# Patient Record
Sex: MALE | Race: WHITE | NOT HISPANIC OR LATINO | Employment: OTHER | URBAN - METROPOLITAN AREA
[De-identification: names, ages, dates, MRNs, and addresses within clinical notes are randomized per-mention and may not be internally consistent; named-entity substitution may affect disease eponyms.]

---

## 2024-10-15 ENCOUNTER — HOSPITAL ENCOUNTER (INPATIENT)
Facility: HOSPITAL | Age: 84
LOS: 5 days | Discharge: HOSPICE | DRG: 190 | End: 2024-10-20
Attending: INTERNAL MEDICINE | Admitting: INTERNAL MEDICINE
Payer: MEDICARE

## 2024-10-15 ENCOUNTER — HOSPITAL ENCOUNTER (INPATIENT)
Dept: OTHER | Facility: HOSPITAL | Age: 84
Discharge: HOME | DRG: 190 | End: 2024-10-15
Payer: MEDICARE

## 2024-10-15 DIAGNOSIS — Z51.5 END OF LIFE CARE: ICD-10-CM

## 2024-10-15 DIAGNOSIS — R26.2 DIFFICULTY IN WALKING: Primary | ICD-10-CM

## 2024-10-15 PROBLEM — J44.1 COPD EXACERBATION: Status: ACTIVE | Noted: 2024-10-15

## 2024-10-15 PROBLEM — J44.1 ACUTE EXACERBATION OF CHRONIC OBSTRUCTIVE PULMONARY DISEASE (COPD): Status: ACTIVE | Noted: 2024-10-15

## 2024-10-15 PROCEDURE — 99223 1ST HOSP IP/OBS HIGH 75: CPT | Performed by: STUDENT IN AN ORGANIZED HEALTH CARE EDUCATION/TRAINING PROGRAM

## 2024-10-15 PROCEDURE — 94799 UNLISTED PULMONARY SVC/PX: CPT

## 2024-10-15 PROCEDURE — 94761 N-INVAS EAR/PLS OXIMETRY MLT: CPT

## 2024-10-15 PROCEDURE — 94640 AIRWAY INHALATION TREATMENT: CPT

## 2024-10-15 RX ORDER — BISACODYL 10 MG
10 SUPPOSITORY, RECTAL RECTAL DAILY PRN
Status: DISCONTINUED | OUTPATIENT
Start: 2024-10-15 | End: 2024-10-20 | Stop reason: HOSPADM

## 2024-10-15 RX ORDER — BUDESONIDE 0.5 MG/2ML
0.5 INHALANT ORAL
Status: DISCONTINUED | OUTPATIENT
Start: 2024-10-15 | End: 2024-10-20 | Stop reason: HOSPADM

## 2024-10-15 RX ORDER — ALBUTEROL SULFATE 0.83 MG/ML
2.5 SOLUTION RESPIRATORY (INHALATION) EVERY 6 HOURS PRN
Status: DISCONTINUED | OUTPATIENT
Start: 2024-10-15 | End: 2024-10-16

## 2024-10-15 RX ORDER — AMOXICILLIN 250 MG
2 CAPSULE ORAL 2 TIMES DAILY PRN
Status: DISCONTINUED | OUTPATIENT
Start: 2024-10-15 | End: 2024-10-20 | Stop reason: HOSPADM

## 2024-10-15 RX ORDER — METHYLPREDNISOLONE SODIUM SUCCINATE 40 MG/ML
40 INJECTION, POWDER, LYOPHILIZED, FOR SOLUTION INTRAMUSCULAR; INTRAVENOUS EVERY 12 HOURS
Status: DISCONTINUED | OUTPATIENT
Start: 2024-10-15 | End: 2024-10-16

## 2024-10-15 RX ORDER — IBUPROFEN 600 MG/1
1 TABLET ORAL
Status: DISCONTINUED | OUTPATIENT
Start: 2024-10-15 | End: 2024-10-20 | Stop reason: HOSPADM

## 2024-10-15 RX ORDER — SODIUM CHLORIDE 9 MG/ML
100 INJECTION, SOLUTION INTRAVENOUS CONTINUOUS
Status: ACTIVE | OUTPATIENT
Start: 2024-10-16 | End: 2024-10-16

## 2024-10-15 RX ORDER — NICOTINE POLACRILEX 4 MG
15 LOZENGE BUCCAL
Status: DISCONTINUED | OUTPATIENT
Start: 2024-10-15 | End: 2024-10-20 | Stop reason: HOSPADM

## 2024-10-15 RX ORDER — POLYETHYLENE GLYCOL 3350 17 G/17G
17 POWDER, FOR SOLUTION ORAL DAILY PRN
Status: DISCONTINUED | OUTPATIENT
Start: 2024-10-15 | End: 2024-10-20 | Stop reason: HOSPADM

## 2024-10-15 RX ORDER — DEXTROSE MONOHYDRATE 25 G/50ML
25 INJECTION, SOLUTION INTRAVENOUS
Status: DISCONTINUED | OUTPATIENT
Start: 2024-10-15 | End: 2024-10-20 | Stop reason: HOSPADM

## 2024-10-15 RX ORDER — BISACODYL 5 MG/1
5 TABLET, DELAYED RELEASE ORAL DAILY PRN
Status: DISCONTINUED | OUTPATIENT
Start: 2024-10-15 | End: 2024-10-20 | Stop reason: HOSPADM

## 2024-10-15 RX ORDER — SODIUM CHLORIDE 0.9 % (FLUSH) 0.9 %
10 SYRINGE (ML) INJECTION EVERY 12 HOURS SCHEDULED
Status: DISCONTINUED | OUTPATIENT
Start: 2024-10-15 | End: 2024-10-20 | Stop reason: HOSPADM

## 2024-10-15 RX ORDER — SODIUM CHLORIDE 0.9 % (FLUSH) 0.9 %
10 SYRINGE (ML) INJECTION AS NEEDED
Status: DISCONTINUED | OUTPATIENT
Start: 2024-10-15 | End: 2024-10-20 | Stop reason: HOSPADM

## 2024-10-15 RX ORDER — ENOXAPARIN SODIUM 100 MG/ML
1 INJECTION SUBCUTANEOUS ONCE
Status: COMPLETED | OUTPATIENT
Start: 2024-10-16 | End: 2024-10-16

## 2024-10-15 RX ORDER — AZITHROMYCIN 250 MG/1
500 TABLET, FILM COATED ORAL
Status: DISPENSED | OUTPATIENT
Start: 2024-10-16 | End: 2024-10-18

## 2024-10-15 RX ORDER — ARFORMOTEROL TARTRATE 15 UG/2ML
15 SOLUTION RESPIRATORY (INHALATION)
Status: DISCONTINUED | OUTPATIENT
Start: 2024-10-15 | End: 2024-10-20 | Stop reason: HOSPADM

## 2024-10-15 RX ADMIN — BUDESONIDE 0.5 MG: 0.5 SUSPENSION RESPIRATORY (INHALATION) at 23:24

## 2024-10-15 RX ADMIN — ARFORMOTEROL TARTRATE 15 MCG: 15 SOLUTION RESPIRATORY (INHALATION) at 23:24

## 2024-10-15 NOTE — PLAN OF CARE
Eric Huerta is an 84-year-old gent with past medical history of COPD on home oxygen, remote history of lung cancer who presented to [facility] and found to be short of breath.  D-dimer was elevated so they got a CT scan of his chest which showed an area that was concerning for malignancy.  Is also noted to be in a COPD exacerbation.  Troponin was mildly elevated thought to be due to demand.  Due to this new mass found on his CT of chest patient will be transferred here for further management of COPD exacerbation as well as evaluation by pulmonology for possible biopsy for lung cancer

## 2024-10-16 ENCOUNTER — APPOINTMENT (OUTPATIENT)
Facility: HOSPITAL | Age: 84
DRG: 190 | End: 2024-10-16
Payer: MEDICARE

## 2024-10-16 LAB

## 2024-10-16 PROCEDURE — 25010000002 ENOXAPARIN PER 10 MG: Performed by: INTERNAL MEDICINE

## 2024-10-16 PROCEDURE — 25010000002 ENOXAPARIN PER 10 MG: Performed by: STUDENT IN AN ORGANIZED HEALTH CARE EDUCATION/TRAINING PROGRAM

## 2024-10-16 PROCEDURE — 99232 SBSQ HOSP IP/OBS MODERATE 35: CPT | Performed by: INTERNAL MEDICINE

## 2024-10-16 PROCEDURE — 25810000003 SODIUM CHLORIDE 0.9 % SOLUTION: Performed by: STUDENT IN AN ORGANIZED HEALTH CARE EDUCATION/TRAINING PROGRAM

## 2024-10-16 PROCEDURE — 82948 REAGENT STRIP/BLOOD GLUCOSE: CPT

## 2024-10-16 PROCEDURE — 94664 DEMO&/EVAL PT USE INHALER: CPT

## 2024-10-16 PROCEDURE — 94799 UNLISTED PULMONARY SVC/PX: CPT

## 2024-10-16 PROCEDURE — 94760 N-INVAS EAR/PLS OXIMETRY 1: CPT

## 2024-10-16 PROCEDURE — 25010000002 METHYLPREDNISOLONE PER 40 MG: Performed by: INTERNAL MEDICINE

## 2024-10-16 PROCEDURE — 25010000002 METHYLPREDNISOLONE PER 40 MG: Performed by: STUDENT IN AN ORGANIZED HEALTH CARE EDUCATION/TRAINING PROGRAM

## 2024-10-16 PROCEDURE — 82948 REAGENT STRIP/BLOOD GLUCOSE: CPT | Performed by: STUDENT IN AN ORGANIZED HEALTH CARE EDUCATION/TRAINING PROGRAM

## 2024-10-16 PROCEDURE — 99223 1ST HOSP IP/OBS HIGH 75: CPT | Performed by: INTERNAL MEDICINE

## 2024-10-16 PROCEDURE — 93970 EXTREMITY STUDY: CPT | Performed by: SURGERY

## 2024-10-16 PROCEDURE — 93970 EXTREMITY STUDY: CPT

## 2024-10-16 PROCEDURE — 25010000002 CEFTRIAXONE PER 250 MG: Performed by: STUDENT IN AN ORGANIZED HEALTH CARE EDUCATION/TRAINING PROGRAM

## 2024-10-16 PROCEDURE — 63710000001 REVEFENACIN 175 MCG/3ML SOLUTION: Performed by: STUDENT IN AN ORGANIZED HEALTH CARE EDUCATION/TRAINING PROGRAM

## 2024-10-16 RX ORDER — IPRATROPIUM BROMIDE AND ALBUTEROL SULFATE 2.5; .5 MG/3ML; MG/3ML
3 SOLUTION RESPIRATORY (INHALATION) EVERY 4 HOURS PRN
COMMUNITY

## 2024-10-16 RX ORDER — POTASSIUM CHLORIDE 1500 MG/1
20 TABLET, EXTENDED RELEASE ORAL DAILY
COMMUNITY

## 2024-10-16 RX ORDER — ESOMEPRAZOLE MAGNESIUM 40 MG/1
40 CAPSULE, DELAYED RELEASE ORAL
COMMUNITY

## 2024-10-16 RX ORDER — FLUTICASONE FUROATE AND VILANTEROL 200; 25 UG/1; UG/1
1 POWDER RESPIRATORY (INHALATION)
COMMUNITY

## 2024-10-16 RX ORDER — METHYLPREDNISOLONE SODIUM SUCCINATE 40 MG/ML
40 INJECTION, POWDER, LYOPHILIZED, FOR SOLUTION INTRAMUSCULAR; INTRAVENOUS EVERY 8 HOURS
Status: DISCONTINUED | OUTPATIENT
Start: 2024-10-16 | End: 2024-10-17

## 2024-10-16 RX ORDER — METOPROLOL TARTRATE 25 MG/1
25 TABLET, FILM COATED ORAL 2 TIMES DAILY
COMMUNITY

## 2024-10-16 RX ORDER — ENOXAPARIN SODIUM 100 MG/ML
1 INJECTION SUBCUTANEOUS EVERY 12 HOURS
Status: DISCONTINUED | OUTPATIENT
Start: 2024-10-16 | End: 2024-10-17

## 2024-10-16 RX ORDER — PAROXETINE 10 MG/1
10 TABLET, FILM COATED ORAL EVERY MORNING
COMMUNITY

## 2024-10-16 RX ORDER — DOXAZOSIN 1 MG/1
1 TABLET ORAL NIGHTLY
COMMUNITY

## 2024-10-16 RX ORDER — IPRATROPIUM BROMIDE AND ALBUTEROL SULFATE 2.5; .5 MG/3ML; MG/3ML
3 SOLUTION RESPIRATORY (INHALATION)
Status: DISCONTINUED | OUTPATIENT
Start: 2024-10-16 | End: 2024-10-20

## 2024-10-16 RX ORDER — PANTOPRAZOLE SODIUM 20 MG/1
20 TABLET, DELAYED RELEASE ORAL DAILY
COMMUNITY

## 2024-10-16 RX ORDER — FUROSEMIDE 20 MG
20 TABLET ORAL DAILY PRN
COMMUNITY

## 2024-10-16 RX ORDER — TAMSULOSIN HYDROCHLORIDE 0.4 MG/1
0.4 CAPSULE ORAL DAILY
Status: DISCONTINUED | OUTPATIENT
Start: 2024-10-16 | End: 2024-10-20 | Stop reason: HOSPADM

## 2024-10-16 RX ADMIN — ENOXAPARIN SODIUM 70 MG: 100 INJECTION SUBCUTANEOUS at 13:03

## 2024-10-16 RX ADMIN — ENOXAPARIN SODIUM 70 MG: 100 INJECTION SUBCUTANEOUS at 02:17

## 2024-10-16 RX ADMIN — SODIUM CHLORIDE 1000 MG: 9 INJECTION, SOLUTION INTRAMUSCULAR; INTRAVENOUS; SUBCUTANEOUS at 12:55

## 2024-10-16 RX ADMIN — IPRATROPIUM BROMIDE AND ALBUTEROL SULFATE 3 ML: .5; 3 SOLUTION RESPIRATORY (INHALATION) at 11:58

## 2024-10-16 RX ADMIN — POLYETHYLENE GLYCOL 3350 17 G: 17 POWDER, FOR SOLUTION ORAL at 12:54

## 2024-10-16 RX ADMIN — METHYLPREDNISOLONE SODIUM SUCCINATE 40 MG: 40 INJECTION, POWDER, FOR SOLUTION INTRAMUSCULAR; INTRAVENOUS at 17:03

## 2024-10-16 RX ADMIN — BUDESONIDE 0.5 MG: 0.5 SUSPENSION RESPIRATORY (INHALATION) at 19:11

## 2024-10-16 RX ADMIN — SODIUM CHLORIDE 100 ML/HR: 9 INJECTION, SOLUTION INTRAVENOUS at 00:30

## 2024-10-16 RX ADMIN — REVEFENACIN 175 MCG: 175 SOLUTION RESPIRATORY (INHALATION) at 07:37

## 2024-10-16 RX ADMIN — TAMSULOSIN HYDROCHLORIDE 0.4 MG: 0.4 CAPSULE ORAL at 12:54

## 2024-10-16 RX ADMIN — Medication 10 ML: at 00:29

## 2024-10-16 RX ADMIN — ARFORMOTEROL TARTRATE 15 MCG: 15 SOLUTION RESPIRATORY (INHALATION) at 19:11

## 2024-10-16 RX ADMIN — ARFORMOTEROL TARTRATE 15 MCG: 15 SOLUTION RESPIRATORY (INHALATION) at 07:36

## 2024-10-16 RX ADMIN — METHYLPREDNISOLONE SODIUM SUCCINATE 40 MG: 40 INJECTION, POWDER, FOR SOLUTION INTRAMUSCULAR; INTRAVENOUS at 08:09

## 2024-10-16 RX ADMIN — IPRATROPIUM BROMIDE AND ALBUTEROL SULFATE 3 ML: .5; 3 SOLUTION RESPIRATORY (INHALATION) at 19:11

## 2024-10-16 RX ADMIN — SODIUM CHLORIDE 100 ML/HR: 9 INJECTION, SOLUTION INTRAVENOUS at 08:10

## 2024-10-16 RX ADMIN — BUDESONIDE 0.5 MG: 0.5 SUSPENSION RESPIRATORY (INHALATION) at 07:36

## 2024-10-16 NOTE — PLAN OF CARE
Goal Outcome Evaluation:               Patient has been NPO on this shift since mid nite for possible scope this morning, family want pulmonary MD to talk with primary MD lachelle about the procedure today, Pt awake this morning stating there was drug deals in his house trying to take his stuff, RN reorientated patient that he was in the hospital.                             Problem: Adult Inpatient Plan of Care  Goal: Plan of Care Review  Outcome: Unable to Meet  Goal: Patient-Specific Goal (Individualized)  Outcome: Unable to Meet  Goal: Absence of Hospital-Acquired Illness or Injury  Outcome: Unable to Meet  Goal: Optimal Comfort and Wellbeing  Outcome: Unable to Meet  Goal: Readiness for Transition of Care  Outcome: Unable to Meet  Goal: Plan of Care Review  Outcome: Unable to Meet  Goal: Patient-Specific Goal (Individualized)  Outcome: Unable to Meet  Goal: Absence of Hospital-Acquired Illness or Injury  Outcome: Unable to Meet  Goal: Optimal Comfort and Wellbeing  Outcome: Unable to Meet  Goal: Readiness for Transition of Care  Outcome: Unable to Meet     Problem: COPD (Chronic Obstructive Pulmonary Disease) Exacerbation  Goal: Optimal Coping with Chronic Illness  Outcome: Unable to Meet  Goal: Optimal Level of Functional [place]  Outcome: Unable to Meet  Goal: Absence of Infection Signs and Symptoms  Outcome: Unable to Meet  Goal: Improved Oral Intake  Outcome: Unable to Meet  Goal: Effective Oxygenation and Ventilation  Outcome: Unable to Meet     Problem: Fall Injury Risk  Goal: Absence of Fall and Fall-Related Injury  Outcome: Unable to Meet     Problem: Skin Injury Risk Increased  Goal: Skin Health and Integrity  Outcome: Unable to Meet

## 2024-10-16 NOTE — CONSULTS
[facility]   Consult Note    Patient Name: Eric Huerta  : 1940  MRN: [id number]  Primary Care Physician:  Isela Mack APRN  Referring Physician: Ariel Roberts MD  Date of admission: 10/15/2024    Inpatient Pulmonology Consult  Consult performed by: Eliazar Doan DO  Consult ordered by: Mando Felix MD        Subjective   Subjective     Reason for Consult/ Chief Complaint: For consultation lung mass    History of Present Illness  Eric Huerta is a 84 y.o. male history of COPD chronic oxygen, atrial fibrillation, hypertension, weakness and debility transferred here from outside facility  Patient been feeling poorly over the course the past 1 week complaining of constipation having some shortness of breath presented to the outside facility patient CT scan CT scan showed infrahilar mass transferred here for further evaluation    Review of Systems   For cough  Positive for weakness  Positive for shortness of breath  Positive weight loss  Personal History     Past Medical History:   Diagnosis Date    Arthritis     Cancer     SKIN CANCER ON FACE HAS NOT SEEN DERMATOLIGIST AS OF 10/15/24    COPD (chronic obstructive pulmonary disease)     GERD (gastroesophageal reflux disease)     Hyperlipidemia     Hypertension        Past Surgical History:   Procedure Laterality Date    CARDIAC SURGERY  2005    CHOLECYSTECTOMY         Family History: family history is not on file. Otherwise pertinent FHx was reviewed and not pertinent to current issue.    Social History:  reports that he quit smoking about 24 years ago. His smoking use included cigarettes. He has never used smokeless tobacco. He reports that he does not currently use alcohol. He reports that he does not use drugs.    Home Medications:        Allergies:  No Known Allergies    Objective    Objective     Vitals:  Temp:  [97.6 °F (36.4 °C)-98.1 °F (36.7 °C)] 97.8 °F (36.6 °C)  Heart Rate:  [65-97] 91  Resp:  [16-24] 20  BP:  (124-172)/(80-95) 129/89  Flow (L/min) (Oxygen Therapy):  [3-4] 3    Physical Exam  Elderly frail male  He has no palpable supraclavicular adenopathy  He is alert  He is hard of hearing  He is able to follow commands  Result Review    Result Review:  I have personally reviewed the results from the time of this admission to 10/16/2024 15:00 EDT and agree with these findings:  [x]  Laboratory  [x]  Microbiology  [x]  Radiology  [x]  EKG/Telemetry   [x]  Cardiology/Vascular   []  Pathology  []  Old records  []  Other:    Most notable findings include: The scan from outside facility showed large infrahilar mass with large subcarinal mass    Assessment & Plan   Assessment / Plan     Brief Patient Summary:  Eric Huerta is a 84 y.o. male who transferred here from outside facility to be evaluated for lung mass    Active Hospital Problems:  Active Hospital Problems    Diagnosis     **Acute exacerbation of chronic obstructive pulmonary disease (COPD)     COPD exacerbation    Large infrahilar/subcarinal mass    Plan:   Explained to the patient as well as the wife who both are questionable in regards to understanding what is actually going on is reported to have periods of lucency    Also explained what is going on to the son and the other family at bedside most certainly the patient does have lung cancer but the question is what type of cancer this is    He is very debilitated and unable to care for himself and has very low functional status    At this time I have recommended against biopsy as the patient would not be out of tolerate any kind of chemotherapy radiation will cancer treatment due to his poor functional status    I have discussed possible bronchoscopy with the patient explained to him that I feel that I can do the bronchoscopy safely however we would likely not do anything with the results of the bronchoscopy given his poor status    His family and the patient are going to further discuss this tonight    In the  meantime the patient does indeed have evidence of pneumonia in the right lower lobe likely degree of postobstructive pneumonia as a CT scan shows near complete obstruction of the right lower lobe bronchus    I agree with the current antibiotics which is ceftriaxone and Zithromax    Will continue treatment of the patient's COPD with Brovana Pulmicort as needed nebs and IV Solu-Medrol    I personally reviewed all imaging, laboratory data, and I spoke with respiratory therapy, and nursing regarding the patient's care, have also spoken with the patient's primary admitting physician regarding his plan of care.      Electronically signed by Eliazar Doan DO, 10/16/24, 3:00 PM EDT.

## 2024-10-16 NOTE — PROGRESS NOTES
Respiratory Therapist Broncho-Pulmonary Hygiene Progress Note      Patient Name:  Eric Huerta  YOB: 1940    Eric Huerta meets the qualification for Level 2 of the [facility]-Pulmonary Hygiene Protocol. This was based on my daily patient assessment and includes review of chest x-ray results, cough ability and quality, oxygenation, secretions or risk for secretion development and patient mobility.     Broncho-Pulmonary Hygiene Assessment:    Level of Movement: Actively changing positions-requires assistance  Disoriented/Follows Commands    Breath Sounds: Diminished and/or coarse rhonchi    Cough: Strong, effective and/or frequent    Chest X-Ray: Possible signs of consolidation and/or atelectasis or clear.     Sputum Productions: Able to produce small to moderate amount, of moderately thick secretions    History and Physical: Home use of oxygen     SpO2 to Oxygen Need: greater than 92% on room air or  less than 3L nasal canula    Current SpO2 is: 98 on 3L    Based on this information, I have completed the following interventions: Aerobika with bronchodialtor medication or TID      Electronically signed by Eliazar Platt, ZOË, 10/15/24, 11:26 PM EDT.

## 2024-10-16 NOTE — PLAN OF CARE
Problem: Skin Injury Risk Increased  Goal: Skin Health and Integrity  Outcome: Progressing     Problem: Adult Inpatient Plan of Care  Goal: Plan of Care Review  Outcome: Progressing  Flowsheets (Taken 10/16/2024 1502)  Progress: no change  Outcome Evaluation: VSS. PT HAS BEEN RESTING THROUGHOUT SHIFT. MD RESTARTED PT ON DIET. FAMILY WANTED PALLIATIVE TO COME TALK TO THEM.  Plan of Care Reviewed With: patient  Goal: Patient-Specific Goal (Individualized)  Outcome: Progressing  Goal: Absence of Hospital-Acquired Illness or Injury  Outcome: Progressing  Intervention: Identify and Manage Fall Risk  Recent Flowsheet Documentation  Taken 10/16/2024 1501 by Jorje Reed RN  Safety Promotion/Fall Prevention: safety round/check completed  Taken 10/16/2024 1144 by Jorje Reed RN  Safety Promotion/Fall Prevention: safety round/check completed  Taken 10/16/2024 0942 by Jorje Reed RN  Safety Promotion/Fall Prevention: safety round/check completed  Taken 10/16/2024 0809 by Jorje Reed RN  Safety Promotion/Fall Prevention: safety round/check completed  Taken 10/16/2024 0730 by Jorje Reed RN  Safety Promotion/Fall Prevention: safety round/check completed  Goal: Optimal Comfort and Wellbeing  Outcome: Progressing  Goal: Readiness for Transition of Care  Outcome: Progressing     Problem: COPD (Chronic Obstructive Pulmonary Disease) Exacerbation  Goal: Optimal Coping with Chronic Illness  Outcome: Progressing  Goal: Optimal Level of Functional [place]  Outcome: Progressing  Goal: Absence of Infection Signs and Symptoms  Outcome: Progressing  Goal: Improved Oral Intake  Outcome: Progressing  Goal: Effective Oxygenation and Ventilation  Outcome: Progressing   Goal Outcome Evaluation:  Plan of Care Reviewed With: patient        Progress: no change  Outcome Evaluation: VSS. PT HAS BEEN RESTING THROUGHOUT SHIFT. MD RESTARTED PT ON DIET. FAMILY WANTED PALLIATIVE TO COME TALK TO THEM.

## 2024-10-16 NOTE — PROGRESS NOTES
"DAILY PROGRESS NOTE  HOSPITALIST GROUP      PATIENT IDENTIFICATION    Name: Eric Huerta  :  1940  MRN: [id number]    CHIEF COMPLAINT/PRINCIPAL DIAGNOSIS: Acute exacerbation of chronic obstructive pulmonary disease (COPD)       SUBJECTIVE    Doing ok. Remains confused    ROS:   Gen: No fever or chills  CV: no chest pain or palpitation  Resp: + shortness of breath and cough  GI: no nausea, vomiting, diarrhea  Neuro: no headache or dizziness     OBJECTIVE     Exam:  /95 (BP Location: Right arm, Patient Position: Lying)   Pulse 94   Temp 97.6 °F (36.4 °C) (Oral)   Resp 24   Ht 167.4 cm (65.91\")   Wt 71.7 kg (158 lb 1.1 oz)   SpO2 99%   BMI 25.59 kg/m²   Intake/Output last 24 hours:    Intake/Output Summary (Last 24 hours) at 10/16/2024 1140  Last data filed at 10/16/2024 0900  Gross per 24 hour   Intake 0 ml   Output 1100 ml   Net -1100 ml        Gen: NAD, up in bed  Resp: rhonchi and wheezing, mild increased work of breathing  CV: RRR, no m/r/g. No peripheral edema  GI: Soft, nontender, (+) BS. nondistended  Psych: Alert and Oriented x 1-2, Mood and affect appropriate to situation  Skin: warm and dry on palpation. No rash on inspection.  Neuro: moves all 4 extremities, follows simple commands    DATA REVIEW:  Lab Results (last 24 hours)       ** No results found for the last 24 hours. **            Imaging Results (Last 24 Hours)       Procedure Component Value Units Date/Time    CT Outside Films [[id number]] Resulted: 10/16/24 0942     Updated: 10/16/24 0942    Narrative:      This procedure was auto-finalized with no dictation required.            Labs and imaging noted above have been personally reviewed by me.    Scheduled Meds:arformoterol, 15 mcg, Nebulization, BID - RT  azithromycin, 500 mg, Oral, Q24H  budesonide, 0.5 mg, Nebulization, BID - RT  cefTRIAXone, 1,000 mg, Intravenous, Q24H  enoxaparin, 1 mg/kg, Subcutaneous, Q12H  ipratropium-albuterol, 3 mL, Nebulization, Q6H While Awake - " RT  methylPREDNISolone sodium succinate, 40 mg, Intravenous, Q8H  sodium chloride, 10 mL, Intravenous, Q12H  tamsulosin, 0.4 mg, Oral, Daily      Continuous Infusions:sodium chloride, 100 mL/hr, Last Rate: 100 mL/hr (10/16/24 0810)      PRN Meds:  senna-docusate sodium **AND** polyethylene glycol **AND** bisacodyl **AND** bisacodyl    dextrose    dextrose    glucagon (human recombinant)    sodium chloride    ASSESSMENT/PLAN      Acute exacerbation of chronic obstructive pulmonary disease (COPD)    COPD exacerbation    Acute on chronic hypoxic respiratory failure  COPD Exacerbation  Acute PE  Possible pneumonia  C/f primary lung ,alignancy  - CTA chest at OSH showed multifocal calcific pleural plaquing with tiny right pleural effusion possibly due to asbestosis, mid mediastinal right infrahilar adenopathy likely neoplastic as well as noncalcified nodule in the left lower lobe also likely neoplastic. Also with encasement of segmental arterial branches of the right lower lobe as well as tiny occlusive thrombus in the subsegmental vessels   - cont CTX/Azithro  - switch yupelri nebs to duonebs, cont budesonide/formoterol nebs  - inc steroids to q8h  - start tx lovenox  - check sputum culture and procal  - have SLP eval  - discussed at length with son regarding findings on CT scan -- he is not even sure if this is cancer that they would want treatment. He is going to discuss with family. Await decision and may ultimately get palliative on board  - he follows with Pulmonologist in [place]    AFib  - tx lovenox  - resume home meds once clarified    Urinary retention  - peoples placed at OSH, cont for now  - start flomax    CAD  - resume home meds once clarified    HTN  - resume home meds once clarified    Dementia  - delirium precautions      Nutrition - Diet: Cardiac; Healthy Heart (2-3 Na+); Fluid Consistency: Thin (IDDSI 0)   DVT Prophylaxis - lovenox  Code Status - full   GI ppx - none  Disposition - tbd       Tommy  MD [facility]  Hospitalist Group  10/16/2024  11:40 EDT

## 2024-10-16 NOTE — H&P
[facility]   HOSPITALIST HISTORY AND PHYSICAL  Date: 10/15/2024   Patient Name: Eric Huerta  : 1940  MRN: [id number]  Primary Care Physician:  Isela Mack APRN  Date of admission: 10/15/2024    Subjective   Subjective     Chief Complaint: Shortness of breath     HPI:    Eric Huerta is a 84 y.o. male with a past medical history of COPD on home oxygen, A-fib on Eliquis, hypertension, hyperlipidemia, CAD, BPH presented to the ER at the outside facility with complaints of shortness of breath that has been progressively worsening in the last 2 weeks.  Associated with worsening cough and sputum production.  Denies any fevers, nausea, vomiting.  Noted to have constipation, last bowel movement 5 days ago.  Labs at the outside facility were significant for WBC elevated at 13, rest of the CBC with no significant findings, ABG 7.4 / on 3 L calcium 12.5, BUN 27, creatinine 0.9, sodium 136, ionized calcium 1.74, rest of the CMP with no significant findings, D-dimer elevated 1336.  Troponin 0.06.  Pro.  BNP elevated at 4394.  Given acute hypoxia and elevated D-dimer, CTA chest was done which showed mid mediastinal right infrahilar adenopathy likely neoplastic as well as noncalcified nodule in the left lower lobe also likely neoplastic.  Differentials includes primary lung neoplasm with hilar adenopathy and metastasis versus mesothelioma which is less likely.  Encasement of segmental arterial branches of the right lower lobe as well as tiny occlusive thrombus in the subsegmental vessels.  Received methylprednisolone 125 mg IV once, ceftriaxone and azithromycin at the outside facility.  Request was made to transfer the patient for further management of COPD exacerbation as well as evaluation by pulmonology for possible biopsy for lung cancer.  Patient accepted under hospitalist service.        Personal History     Past Medical History:   Diagnosis Date    Arthritis     Cancer     SKIN CANCER ON FACE HAS NOT  SEEN DERMATOLIGIST AS OF 10/15/24    COPD (chronic obstructive pulmonary disease)     GERD (gastroesophageal reflux disease)     Hyperlipidemia     Hypertension          Past Surgical History:   Procedure Laterality Date    CARDIAC SURGERY  2005    CHOLECYSTECTOMY           History reviewed. No pertinent family history.      Social History     Socioeconomic History    Marital status:    Tobacco Use    Smoking status: Former     Current packs/day: 0.00     Types: Cigarettes     Quit date:      Years since quittin.8    Smokeless tobacco: Never   Vaping Use    Vaping status: Never Used   Substance and Sexual Activity    Alcohol use: Not Currently     Comment: QUIT 30 YEARS AGO    Drug use: Never    Sexual activity: Defer         Home Medications:       Allergies:  No Known Allergies      Objective   Objective     Vitals:   Temp:  [98 °F (36.7 °C)-98.1 °F (36.7 °C)] 98.1 °F (36.7 °C)  Heart Rate:  [65-74] 65  Resp:  [18-20] 20  BP: (124-155)/(80) 124/80  Flow (L/min) (Oxygen Therapy):  [4] 4    Physical Exam    Constitutional: Awake, alert, no acute distress   Eyes: Pupils equal, sclerae anicteric, no conjunctival injection   HENT: NCAT, mucous membranes moist   Respiratory: Bilateral air entry present, diffuse wheezing bilaterally, mild rhonchi in the right lower lobe region, nonlabored respirations    Cardiovascular: RRR, no murmurs, rubs, or gallops, palpable pedal pulses bilaterally   Gastrointestinal: Positive bowel sounds, soft, nontender, nondistended   Musculoskeletal: No bilateral ankle edema, no clubbing or cyanosis to extremities   Neurologic: Oriented x 1 to self, strength symmetric in all extremities, Cranial Nerves grossly intact to confrontation, speech clear   Skin: No rashes     Result Review    Result Review:  I have personally reviewed the results from the time of this admission to 10/15/2024 23:13 EDT and agree with these findings:  [x]  Laboratory  []  Microbiology  [x]   Radiology  [x]  EKG/Telemetry   []  Cardiology/Vascular   []  Pathology  []  Old records  []  Other:        Imaging Results (Last 24 Hours)       ** No results found for the last 24 hours. **             arformoterol, 15 mcg, Nebulization, BID - RT  [START ON 10/16/2024] azithromycin, 500 mg, Oral, Q24H  budesonide, 0.5 mg, Nebulization, BID - RT  [START ON 10/16/2024] cefTRIAXone, 1,000 mg, Intravenous, Q24H  [START ON 10/16/2024] enoxaparin, 1 mg/kg, Subcutaneous, Once  methylPREDNISolone sodium succinate, 40 mg, Intravenous, Q12H  [START ON 10/16/2024] revefenacin, 175 mcg, Nebulization, Daily - RT  sodium chloride, 10 mL, Intravenous, Q12H         Assessment & Plan   Assessment / Plan     Assessment/Plan:   Acute on chronic hypoxic respiratory failure  COPD exacerbation  Noncalcified nodule in the left lower lobe concerning for malignancy  Mid mediastinal and right infrahilar adenopathy  Tiny occlusive thrombus in the subsegmental vessels of the right lower lobe  Hypercalcemia  COPD on chronic home oxygen  A-fib on Eliquis 2.5 mg twice daily  Hypertension  Hyperlipidemia  CAD s/p CABG  BPH    Plan  Admit to observation, remote telemetry  Continue oxygen  Continuous pulse ox  Brovana, Pulmicort, revefenacin  Bronchodilator protocol  Albuterol every 6 hours as needed  Solu-Medrol 40 mg IV twice daily  Patient takes Eliquis 2.5 mg twice daily for A-fib.  Hold Eliquis.  Will give a single dose of therapeutic Lovenox tonight for tiny occlusive thrombus in the subsegmental vessels of the right lower lobe.  Discuss with pulmonology in the a.m. regarding continuation of Lovenox versus switching to full dose Eliquis after the bronchoscopy and biopsy.  Patient's family would like to have our pulmonologist discussed with the patient's outpatient pulmonologist Dr. Lees prior to making a decision on bronchoscopy.  Follow-up on venous Doppler bilateral lower extremities  Pulmonology consult in the a.m.  N.p.o. except sips  with meds after midnight for possible bronchoscopy in the a.m.  Noted to have hypercalcemia on the outside labs.  Could be likely secondary to malignancy versus dehydration.  Will start on normal saline at 100 cc/h for next 12 hours.  Monitor calcium levels with a.m. labs  Heart healthy diet  Resume other appropriate home medications once reconciled      VTE Prophylaxis:  Pharmacologic & mechanical VTE prophylaxis orders are present.        CODE STATUS:    Level Of Support Discussed With: Health Care Surrogate  Code Status (Patient has no pulse and is not breathing): CPR (Attempt to Resuscitate)  Medical Interventions (Patient has pulse or is breathing): Full Support      Admission Status:  I believe this patient meets inpatient status.    Part of this note may be an electronic transcription/translation of spoken language to printed text using the Dragon Dictation System    Mando Felix MD

## 2024-10-16 NOTE — CONSULTS
Purpose of the visit was to evaluate for: goals of care/advanced care planning and support for patient/family. Spoke with RN and family and discussed palliative care.      Assessment: Mr. Huerta history of COPD chronic oxygen, atrial fibrillation, hypertension, weakness and debility transferred here from outside facility. Patient been feeling poorly over the course the past 1 week complaining of constipation having some shortness of breath presented to the outside facility patient CT scan CT scan showed infrahilar mass transferred here for further evaluation. Pt in bed with eyes closed family not at bedside. Spoke with primary RN who updated on recent news of lung mass. Called spouse and explained who I was with and asked if we could have a meeting with her, son, and daughter. Family meeting to happen 10/17/24 at 11am. Palliative care will continue to follow.       Recommendations/Plan: [facility] referral.    Tasks Completed: Emotional Support.    Josephine CHANEY RN  Palliative Care

## 2024-10-17 LAB
ANION GAP SERPL CALCULATED.3IONS-SCNC: 7.4 MMOL/L (ref 5–15)
BUN SERPL-MCNC: 21 MG/DL (ref 8–23)
BUN/CREAT SERPL: 23.1 (ref 7–25)
CALCIUM SPEC-SCNC: 11.7 MG/DL (ref 8.6–10.5)
CHLORIDE SERPL-SCNC: 97 MMOL/L (ref 98–107)
CO2 SERPL-SCNC: 31.6 MMOL/L (ref 22–29)
CREAT SERPL-MCNC: 0.91 MG/DL (ref 0.76–1.27)
DEPRECATED RDW RBC AUTO: 44.6 FL (ref 37–54)
EGFRCR SERPLBLD CKD-EPI 2021: 82.6 ML/MIN/1.73
ERYTHROCYTE [DISTWIDTH] IN BLOOD BY AUTOMATED COUNT: 12.7 % (ref 12.3–15.4)
GLUCOSE BLDC GLUCOMTR-MCNC: 124 MG/DL (ref 70–99)
GLUCOSE BLDC GLUCOMTR-MCNC: 134 MG/DL (ref 70–99)
GLUCOSE BLDC GLUCOMTR-MCNC: 136 MG/DL (ref 70–99)
GLUCOSE BLDC GLUCOMTR-MCNC: 161 MG/DL (ref 70–99)
GLUCOSE SERPL-MCNC: 132 MG/DL (ref 65–99)
HCT VFR BLD AUTO: 41.5 % (ref 37.5–51)
HGB BLD-MCNC: 13.4 G/DL (ref 13–17.7)
LYMPHOCYTES # BLD MANUAL: 0.16 10*3/MM3 (ref 0.7–3.1)
LYMPHOCYTES NFR BLD MANUAL: 2 % (ref 5–12)
MCH RBC QN AUTO: 30.8 PG (ref 26.6–33)
MCHC RBC AUTO-ENTMCNC: 32.3 G/DL (ref 31.5–35.7)
MCV RBC AUTO: 95.4 FL (ref 79–97)
MONOCYTES # BLD: 0.31 10*3/MM3 (ref 0.1–0.9)
MYELOCYTES NFR BLD MANUAL: 1 % (ref 0–0)
NEUTROPHILS # BLD AUTO: 15.1 10*3/MM3 (ref 1.7–7)
NEUTROPHILS NFR BLD MANUAL: 95 % (ref 42.7–76)
NEUTS BAND NFR BLD MANUAL: 1 % (ref 0–5)
PLATELET # BLD AUTO: 186 10*3/MM3 (ref 140–450)
PMV BLD AUTO: 10.6 FL (ref 6–12)
POTASSIUM SERPL-SCNC: 3.7 MMOL/L (ref 3.5–5.2)
PROCALCITONIN SERPL-MCNC: 0.1 NG/ML (ref 0–0.25)
RBC # BLD AUTO: 4.35 10*6/MM3 (ref 4.14–5.8)
RBC MORPH BLD: NORMAL
SMALL PLATELETS BLD QL SMEAR: ADEQUATE
SODIUM SERPL-SCNC: 136 MMOL/L (ref 136–145)
VARIANT LYMPHS NFR BLD MANUAL: 1 % (ref 19.6–45.3)
WBC MORPH BLD: NORMAL
WBC NRBC COR # BLD AUTO: 15.73 10*3/MM3 (ref 3.4–10.8)

## 2024-10-17 PROCEDURE — 25010000002 ENOXAPARIN PER 10 MG: Performed by: INTERNAL MEDICINE

## 2024-10-17 PROCEDURE — 99232 SBSQ HOSP IP/OBS MODERATE 35: CPT | Performed by: INTERNAL MEDICINE

## 2024-10-17 PROCEDURE — 94760 N-INVAS EAR/PLS OXIMETRY 1: CPT

## 2024-10-17 PROCEDURE — 25010000002 METHYLPREDNISOLONE PER 40 MG: Performed by: INTERNAL MEDICINE

## 2024-10-17 PROCEDURE — 94799 UNLISTED PULMONARY SVC/PX: CPT

## 2024-10-17 PROCEDURE — 25010000002 CEFTRIAXONE PER 250 MG: Performed by: STUDENT IN AN ORGANIZED HEALTH CARE EDUCATION/TRAINING PROGRAM

## 2024-10-17 PROCEDURE — 97161 PT EVAL LOW COMPLEX 20 MIN: CPT

## 2024-10-17 PROCEDURE — 94664 DEMO&/EVAL PT USE INHALER: CPT

## 2024-10-17 PROCEDURE — 80048 BASIC METABOLIC PNL TOTAL CA: CPT | Performed by: INTERNAL MEDICINE

## 2024-10-17 PROCEDURE — 85007 BL SMEAR W/DIFF WBC COUNT: CPT | Performed by: INTERNAL MEDICINE

## 2024-10-17 PROCEDURE — 82948 REAGENT STRIP/BLOOD GLUCOSE: CPT | Performed by: STUDENT IN AN ORGANIZED HEALTH CARE EDUCATION/TRAINING PROGRAM

## 2024-10-17 PROCEDURE — 85025 COMPLETE CBC W/AUTO DIFF WBC: CPT | Performed by: INTERNAL MEDICINE

## 2024-10-17 PROCEDURE — 84145 PROCALCITONIN (PCT): CPT | Performed by: INTERNAL MEDICINE

## 2024-10-17 PROCEDURE — 82948 REAGENT STRIP/BLOOD GLUCOSE: CPT

## 2024-10-17 PROCEDURE — 94761 N-INVAS EAR/PLS OXIMETRY MLT: CPT

## 2024-10-17 RX ORDER — METOPROLOL TARTRATE 25 MG/1
25 TABLET, FILM COATED ORAL 2 TIMES DAILY
Status: DISCONTINUED | OUTPATIENT
Start: 2024-10-17 | End: 2024-10-20 | Stop reason: HOSPADM

## 2024-10-17 RX ORDER — PANTOPRAZOLE SODIUM 40 MG/1
40 TABLET, DELAYED RELEASE ORAL
Status: DISCONTINUED | OUTPATIENT
Start: 2024-10-17 | End: 2024-10-20 | Stop reason: HOSPADM

## 2024-10-17 RX ORDER — METHYLPREDNISOLONE SODIUM SUCCINATE 40 MG/ML
40 INJECTION, POWDER, LYOPHILIZED, FOR SOLUTION INTRAMUSCULAR; INTRAVENOUS EVERY 12 HOURS
Status: DISCONTINUED | OUTPATIENT
Start: 2024-10-17 | End: 2024-10-19

## 2024-10-17 RX ORDER — PAROXETINE 20 MG/1
10 TABLET, FILM COATED ORAL EVERY MORNING
Status: DISCONTINUED | OUTPATIENT
Start: 2024-10-17 | End: 2024-10-20 | Stop reason: HOSPADM

## 2024-10-17 RX ORDER — METOPROLOL TARTRATE 25 MG/1
25 TABLET, FILM COATED ORAL ONCE
Status: COMPLETED | OUTPATIENT
Start: 2024-10-17 | End: 2024-10-17

## 2024-10-17 RX ORDER — ATORVASTATIN CALCIUM 10 MG/1
10 TABLET, FILM COATED ORAL DAILY
Status: DISCONTINUED | OUTPATIENT
Start: 2024-10-17 | End: 2024-10-20 | Stop reason: HOSPADM

## 2024-10-17 RX ORDER — TERAZOSIN 1 MG/1
1 CAPSULE ORAL NIGHTLY
Status: DISCONTINUED | OUTPATIENT
Start: 2024-10-17 | End: 2024-10-20 | Stop reason: HOSPADM

## 2024-10-17 RX ADMIN — Medication 10 ML: at 23:08

## 2024-10-17 RX ADMIN — METHYLPREDNISOLONE SODIUM SUCCINATE 40 MG: 40 INJECTION, POWDER, FOR SOLUTION INTRAMUSCULAR; INTRAVENOUS at 02:12

## 2024-10-17 RX ADMIN — IPRATROPIUM BROMIDE AND ALBUTEROL SULFATE 3 ML: .5; 3 SOLUTION RESPIRATORY (INHALATION) at 11:44

## 2024-10-17 RX ADMIN — ARFORMOTEROL TARTRATE 15 MCG: 15 SOLUTION RESPIRATORY (INHALATION) at 18:53

## 2024-10-17 RX ADMIN — METHYLPREDNISOLONE SODIUM SUCCINATE 40 MG: 40 INJECTION, POWDER, FOR SOLUTION INTRAMUSCULAR; INTRAVENOUS at 23:07

## 2024-10-17 RX ADMIN — ENOXAPARIN SODIUM 70 MG: 100 INJECTION SUBCUTANEOUS at 02:49

## 2024-10-17 RX ADMIN — SODIUM CHLORIDE 1000 MG: 9 INJECTION, SOLUTION INTRAMUSCULAR; INTRAVENOUS; SUBCUTANEOUS at 11:52

## 2024-10-17 RX ADMIN — PANTOPRAZOLE SODIUM 40 MG: 40 TABLET, DELAYED RELEASE ORAL at 09:09

## 2024-10-17 RX ADMIN — Medication 10 ML: at 08:19

## 2024-10-17 RX ADMIN — BUDESONIDE 0.5 MG: 0.5 SUSPENSION RESPIRATORY (INHALATION) at 06:09

## 2024-10-17 RX ADMIN — TAMSULOSIN HYDROCHLORIDE 0.4 MG: 0.4 CAPSULE ORAL at 08:19

## 2024-10-17 RX ADMIN — BUDESONIDE 0.5 MG: 0.5 SUSPENSION RESPIRATORY (INHALATION) at 18:53

## 2024-10-17 RX ADMIN — BISACODYL 5 MG: 5 TABLET, COATED ORAL at 08:19

## 2024-10-17 RX ADMIN — METOPROLOL TARTRATE 25 MG: 25 TABLET, FILM COATED ORAL at 23:07

## 2024-10-17 RX ADMIN — METHYLPREDNISOLONE SODIUM SUCCINATE 40 MG: 40 INJECTION, POWDER, FOR SOLUTION INTRAMUSCULAR; INTRAVENOUS at 08:19

## 2024-10-17 RX ADMIN — APIXABAN 5 MG: 2.5 TABLET, FILM COATED ORAL at 11:53

## 2024-10-17 RX ADMIN — METOPROLOL TARTRATE 25 MG: 25 TABLET, FILM COATED ORAL at 09:09

## 2024-10-17 RX ADMIN — ATORVASTATIN CALCIUM 10 MG: 10 TABLET, FILM COATED ORAL at 09:09

## 2024-10-17 RX ADMIN — AZITHROMYCIN DIHYDRATE 500 MG: 250 TABLET ORAL at 08:19

## 2024-10-17 RX ADMIN — IPRATROPIUM BROMIDE AND ALBUTEROL SULFATE 3 ML: .5; 3 SOLUTION RESPIRATORY (INHALATION) at 06:09

## 2024-10-17 RX ADMIN — ARFORMOTEROL TARTRATE 15 MCG: 15 SOLUTION RESPIRATORY (INHALATION) at 06:09

## 2024-10-17 RX ADMIN — IPRATROPIUM BROMIDE AND ALBUTEROL SULFATE 3 ML: .5; 3 SOLUTION RESPIRATORY (INHALATION) at 18:53

## 2024-10-17 RX ADMIN — Medication 10 ML: at 02:14

## 2024-10-17 RX ADMIN — PAROXETINE HYDROCHLORIDE 10 MG: 20 TABLET, FILM COATED ORAL at 09:09

## 2024-10-17 RX ADMIN — TERAZOSIN HYDROCHLORIDE 1 MG: 1 CAPSULE ORAL at 23:07

## 2024-10-17 RX ADMIN — METOPROLOL TARTRATE 25 MG: 25 TABLET, FILM COATED ORAL at 11:53

## 2024-10-17 RX ADMIN — APIXABAN 5 MG: 2.5 TABLET, FILM COATED ORAL at 23:07

## 2024-10-17 NOTE — PROGRESS NOTES
[facility]     Progress Note    Patient Name: Eric Huerta  : 1940  MRN: [id number]  Primary Care Physician:  Isela Mack APRN  Date of admission: 10/15/2024    Subjective   Subjective     Chief Complaint: Reason for consultation lung mass    History of Present Illness  Patient Reports improvement today  On 3 L    Review of Systems    Objective   Objective     Vitals:   Temp:  [97.3 °F (36.3 °C)-98.1 °F (36.7 °C)] 97.5 °F (36.4 °C)  Heart Rate:  [] 103  Resp:  [18-20] 20  BP: (102-143)/(70-99) 102/70  Flow (L/min) (Oxygen Therapy):  [3] 3    Physical Exam   Pleasant elderly male  In no acute distress  Result Review    Result Review:  I have personally reviewed the results from the time of this admission to 10/17/2024 15:42 EDT and agree with these findings:  []  Laboratory list / accordion  []  Microbiology  []  Radiology  []  EKG/Telemetry   []  Cardiology/Vascular   []  Pathology  []  Old records  []  Other:        Assessment & Plan   Assessment / Plan     Brief Patient Summary:  Eric Huerta is a 84 y.o. male who admitted to the hospital with lung mass    Active Hospital Problems:  Active Hospital Problems    Diagnosis     **Acute exacerbation of chronic obstructive pulmonary disease (COPD)     COPD exacerbation    Lung mass  Plan:   At this time the patient and his family have elected to pursue hospice route which I feel is appropriate given his advanced disease and his frail state    In the meantime would recommend continuation of antibiotics for his right lower lobe pneumonia recommend a total of 5 days of treatment    New IV Solu-Medrol    Continue Brovana and Pulmicort    We will sign off at this time since patient is going to go home with hospice please call with any further questions    VTE Prophylaxis:  Pharmacologic & mechanical VTE prophylaxis orders are present.        CODE STATUS:    Medical Intervention Limits: No intubation (DNI)  Level Of Support Discussed With: Patient; Health  Care Surrogate  Code Status (Patient has no pulse and is not breathing): No CPR (Do Not Attempt to Resuscitate)  Medical Interventions (Patient has pulse or is breathing): Limited Support      Eliazar Doan DO    Electronically signed by Eliazar Doan DO, 10/17/24, 3:44 PM EDT.

## 2024-10-17 NOTE — NURSING NOTE
Met with family and discussed goals of care, [facility] and code status. Family asked questions answers provided. Consult for [facility] requested and sent. CODE STATUS- DNR/DNI. [place]-MOST form completed will be signed by MD, scanned, copy given to family, original placed in file. Spoke with Primary RN. MD aware, new orders replaced. Palliative care will continue to follow.      Josephine CHANEY RN  Palliative Care

## 2024-10-17 NOTE — PLAN OF CARE
Problem: Skin Injury Risk Increased  Goal: Skin Health and Integrity  Outcome: Progressing     Problem: Adult Inpatient Plan of Care  Goal: Plan of Care Review  Outcome: Progressing  Flowsheets (Taken 10/17/2024 1525)  Progress: no change  Outcome Evaluation: pt was in a-fib and . md informed and ordered home medications. family met with david and they are putting in referral for hospice.  Plan of Care Reviewed With: patient  Goal: Patient-Specific Goal (Individualized)  Outcome: Progressing  Goal: Absence of Hospital-Acquired Illness or Injury  Outcome: Progressing  Intervention: Identify and Manage Fall Risk  Recent Flowsheet Documentation  Taken 10/17/2024 1521 by Jorje Reed RN  Safety Promotion/Fall Prevention: safety round/check completed  Taken 10/17/2024 1326 by Jorje Reed RN  Safety Promotion/Fall Prevention: safety round/check completed  Taken 10/17/2024 1130 by Jorje Reed RN  Safety Promotion/Fall Prevention: safety round/check completed  Taken 10/17/2024 0915 by Jorje Reed RN  Safety Promotion/Fall Prevention: safety round/check completed  Taken 10/17/2024 0819 by Jorje Reed RN  Safety Promotion/Fall Prevention: safety round/check completed  Goal: Optimal Comfort and Wellbeing  Outcome: Progressing  Goal: Readiness for Transition of Care  Outcome: Progressing     Problem: COPD (Chronic Obstructive Pulmonary Disease) Exacerbation  Goal: Optimal Coping with Chronic Illness  Outcome: Progressing  Goal: Optimal Level of Functional [place]  Outcome: Progressing  Goal: Absence of Infection Signs and Symptoms  Outcome: Progressing  Goal: Improved Oral Intake  Outcome: Progressing  Goal: Effective Oxygenation and Ventilation  Outcome: Progressing  Intervention: Promote Airway Secretion Clearance  Recent Flowsheet Documentation  Taken 10/17/2024 0819 by Jorje Reed RN  Cough And Deep Breathing: done independently per patient     Problem: Comorbidity Management  Goal:  Maintenance of COPD Symptom Control  Outcome: Progressing     Problem: Palliative Care  Goal: Enhanced Quality of Life  Outcome: Progressing     Problem: Fall Injury Risk  Goal: Absence of Fall and Fall-Related Injury  Outcome: Progressing  Intervention: Promote Injury-Free Environment  Recent Flowsheet Documentation  Taken 10/17/2024 1521 by Jorje Reed RN  Safety Promotion/Fall Prevention: safety round/check completed  Taken 10/17/2024 1326 by Jorje Reed RN  Safety Promotion/Fall Prevention: safety round/check completed  Taken 10/17/2024 1130 by Jorje Reed RN  Safety Promotion/Fall Prevention: safety round/check completed  Taken 10/17/2024 0915 by Jorje Reed RN  Safety Promotion/Fall Prevention: safety round/check completed  Taken 10/17/2024 0819 by Jorje Reed RN  Safety Promotion/Fall Prevention: safety round/check completed   Goal Outcome Evaluation:  Plan of Care Reviewed With: patient        Progress: no change  Outcome Evaluation: pt was in a-fib and . md informed and ordered home medications. family met with david and they are putting in referral for hospice.

## 2024-10-17 NOTE — PLAN OF CARE
Goal Outcome Evaluation:  Plan of Care Reviewed With: (P) patient, son           Outcome Evaluation: (P) Pt presents with deficits in B LE strength as well as overall mobility, balance, and endurance. He experienced a drop in SPO2 to 84% and increased HR to 160 BPM when ambulating. Skilled PT intervention is necessary to address noted deficits. Recommend d/c to sub acute rehab.    Anticipated Discharge Disposition (PT): (P) sub acute care setting

## 2024-10-17 NOTE — THERAPY EVALUATION
Acute Care - Physical Therapy Initial Evaluation   Sudarshan     Patient Name: Eric Huerta  : 1940  MRN: [id number]  Today's Date: 10/17/2024   Onset of Illness/Injury or Date of Surgery: (P) 10/15/24  Visit Dx:     ICD-10-CM ICD-9-CM   1. Difficulty in walking  R26.2 719.7     Patient Active Problem List   Diagnosis    COPD exacerbation    Acute exacerbation of chronic obstructive pulmonary disease (COPD)     Past Medical History:   Diagnosis Date    Arthritis     Cancer     SKIN CANCER ON FACE HAS NOT SEEN DERMATOLIGIST AS OF 10/15/24    COPD (chronic obstructive pulmonary disease)     GERD (gastroesophageal reflux disease)     Hyperlipidemia     Hypertension      Past Surgical History:   Procedure Laterality Date    CARDIAC SURGERY  2005    CHOLECYSTECTOMY       PT Assessment (Last 12 Hours)       PT Evaluation and Treatment       Row Name 10/17/24 1135          Physical Therapy Time and Intention    Subjective Information no complaints (P)   -EW     Document Type evaluation (P)   -EW     Mode of Treatment individual therapy;physical therapy (P)   -EW     Total Minutes, Physical Therapy 35 (P)   -EW     Patient Effort good (P)   -EW     Symptoms Noted During/After Treatment shortness of breath (P)   -EW       Row Name 10/17/24 1135          General Information    Patient Profile Reviewed yes (P)   -EW     Onset of Illness/Injury or Date of Surgery 10/15/24 (P)   -EW     Referring Physician Remberto Hoffman MD (P)   -EW     Patient Observations alert;cooperative;agree to therapy (P)   -EW     Prior Level of Function independent: (P)   -EW     Equipment Currently Used at Home cane, straight;shower chair;walker, rolling (P)   -EW     Existing Precautions/Restrictions fall;oxygen therapy device and L/min (P)   -EW     Benefits Reviewed patient:;family:;improve function;increase independence;increase strength;increase balance;decrease pain (P)   -EW     Barriers to Rehab none identified (P)   -EW        Row Name 10/17/24 1135          Living Environment    Current Living Arrangements home (P)   -EW     People in Home spouse (P)   -EW     Primary Care Provided by self (P)   -EW       Row Name 10/17/24 1135          Home Use of Assistive/Adaptive Equipment    Equipment Currently Used at Home cane, straight;oxygen;shower chair;walker, rolling (P)   -EW       Row Name 10/17/24 1135          Range of Motion (ROM)    Range of Motion bilateral lower extremities;ROM is WFL (P)   -EW       Row Name 10/17/24 1135          Strength (Manual Muscle Testing)    Strength (Manual Muscle Testing) bilateral lower extremities (P)   B LE: 4/5 all MMT  -EW       Row Name 10/17/24 1135          Bed Mobility    Bed Mobility bed mobility (all) activities (P)   -EW     All Activities, [place] (Bed Mobility) minimum assist (75% patient effort);1 person assist (P)   -EW     Assistive Device (Bed Mobility) repositioning sheet (P)   -EW       Row Name 10/17/24 1135          Transfers    Transfers sit-stand transfer;stand-sit transfer (P)   -EW       Row Name 10/17/24 1135          Sit-Stand Transfer    Sit-Stand [place] (Transfers) minimum assist (75% patient effort);1 person assist (P)   -EW     Assistive Device (Sit-Stand Transfers) walker, front-wheeled (P)   -EW       Row Name 10/17/24 1135          Stand-Sit Transfer    Stand-Sit [place] (Transfers) minimum assist (75% patient effort);1 person assist (P)   -EW     Assistive Device (Stand-Sit Transfers) walker, front-wheeled (P)   -EW       Row Name 10/17/24 1135          Gait/Stairs (Locomotion)    Gait/Stairs Locomotion gait/ambulation assistive device (P)   -EW     [place] Level (Gait) contact guard (P)   -EW     Assistive Device (Gait) walker, front-wheeled (P)   -EW     Patient was able to Ambulate yes (P)   -EW     Distance in Feet (Gait) 15 (P)   -EW       Row Name 10/17/24 1139          Safety Issues/Impairments Affecting Functional Mobility    Impairments  Affecting Function (Mobility) balance;coordination;endurance/activity tolerance;shortness of breath;strength (P)   -EW       Row Name 10/17/24 1135          Balance    Balance Assessment sitting static balance;standing dynamic balance (P)   -EW     Static Sitting Balance independent (P)   -EW     Position, Sitting Balance unsupported;sitting edge of bed (P)   -EW     Dynamic Standing Balance contact guard (P)   -EW     Position/Device Used, Standing Balance supported;walker, front-wheeled (P)   -EW       Row Name 10/17/24 1135          Plan of Care Review    Plan of Care Reviewed With patient;son (P)   -EW     Outcome Evaluation Pt presents with deficits in B LE strength as well as overall mobility, balance, and endurance. He experienced a drop in SPO2 to 84% and increased HR to 160 BPM when ambulating. Skilled PT intervention is necessary to address noted deficits. Recommend d/c to sub acute rehab. (P)   -EW       Row Name 10/17/24 1131          Therapy Assessment/Plan (PT)    Rehab Potential (PT) good (P)   -EW     Criteria for Skilled Interventions Met (PT) yes;skilled treatment is necessary (P)   -EW     Therapy Frequency (PT) daily (P)   -EW     Predicted Duration of Therapy Intervention (PT) 10 days (P)   -EW     Problem List (PT) problems related to;balance;mobility;strength;other (see comments) (P)   endurance, activity tolerance, and shortness of breath  -EW     Activity Limitations Related to Problem List (PT) unable to ambulate safely;unable to transfer safely (P)   -EW       Row Name 10/17/24 4151          Therapy Plan Review/Discharge Plan (PT)    Therapy Plan Review (PT) evaluation/treatment results reviewed;participants included;patient;son (P)   -EW       Row Name 10/17/24 7092          Physical Therapy Goals    Transfer Goal Selection (PT) transfer, PT goal 1 (P)   -EW     Gait Training Goal Selection (PT) gait training, PT goal 1 (P)   -EW       Row Name 10/17/24 1135          Transfer Goal 1 (PT)     Activity/Assistive Device (Transfer Goal 1, PT) transfers, all (P)   -EW     [place] Level/Cues Needed (Transfer Goal 1, PT) independent (P)   -EW     Time Frame (Transfer Goal 1, PT) long term goal (LTG);10 days (P)   -EW       Row Name 10/17/24 1135          Gait Training Goal 1 (PT)    Activity/Assistive Device (Gait Training Goal 1, PT) gait (walking locomotion) (P)   -EW     [place] Level (Gait Training Goal 1, PT) independent (P)   -EW     Distance (Gait Training Goal 1, PT) 100 ft (P)   -EW     Time Frame (Gait Training Goal 1, PT) long term goal (LTG);10 days (P)   -EW               User Key  (r) = Recorded By, (t) = Taken By, (c) = Cosigned By      Initials Name Provider Type    Matheus Jara, PT Student PT Student                    Physical Therapy Education       Title: PT OT SLP Therapies (Done)       Topic: Physical Therapy (Done)       Point: Mobility training (Done)       Learning Progress Summary            Patient Acceptance, E,TB, VU by EW at 10/17/2024 1144                      Point: Home exercise program (Done)       Learning Progress Summary            Patient Acceptance, E,TB, VU by EW at 10/17/2024 1144                      Point: Body mechanics (Done)       Learning Progress Summary            Patient Acceptance, E,TB, VU by EW at 10/17/2024 1144                      Point: Precautions (Done)       Learning Progress Summary            Patient Acceptance, E,TB, VU by EW at 10/17/2024 1144                                      User Key       Initials Effective Dates Name Provider Type Discipline     08/27/24 -  Matheus Jones, PT Student PT Student PT                  PT Recommendation and Plan  Anticipated Discharge Disposition (PT): (P) sub acute care setting  Planned Therapy Interventions (PT): (P) balance training, bed mobility training, gait training, home exercise program, motor coordination training, neuromuscular re-education, patient/family education, postural  re-education, ROM (range of motion), stair training, strengthening, stretching, transfer training  Therapy Frequency (PT): (P) daily  Plan of Care Reviewed With: (P) patient, son  Outcome Evaluation: (P) Pt presents with deficits in B LE strength as well as overall mobility, balance, and endurance. He experienced a drop in SPO2 to 84% and increased HR to 160 BPM when ambulating. Skilled PT intervention is necessary to address noted deficits. Recommend d/c to sub acute rehab.   Outcome Measures       Row Name 10/17/24 1100             How much help from another person do you currently need...    Turning from your back to your side while in flat bed without using bedrails? 3 (P)   -EW      Moving from lying on back to sitting on the side of a flat bed without bedrails? 3 (P)   -EW      Moving to and from a bed to a chair (including a wheelchair)? 2 (P)   -EW      Standing up from a chair using your arms (e.g., wheelchair, bedside chair)? 3 (P)   -EW      Climbing 3-5 steps with a railing? 2 (P)   -EW      To walk in hospital room? 3 (P)   -EW      AM-PAC 6 Clicks Score (PT) 16 (P)   -EW         Functional Assessment    Outcome Measure Options AM-PAC 6 Clicks Basic Mobility (PT) (P)   -EW                User Key  (r) = Recorded By, (t) = Taken By, (c) = Cosigned By      Initials Name Provider Type    EW WeMatheus philip, PT Student PT Student                     Time Calculation:    PT Charges       Row Name 10/17/24 1135             Time Calculation    PT Received On 10/17/24 (P)   -EW      PT Goal Re-Cert Due Date 10/26/24 (P)   -EW         Untimed Charges    PT Eval/Re-eval Minutes 35 (P)   -EW         Total Minutes    Untimed Charges Total Minutes 35 (P)   -EW       Total Minutes 35 (P)   -EW                User Key  (r) = Recorded By, (t) = Taken By, (c) = Cosigned By      Initials Name Provider Type    EW Wegenast, Matheus, PT Student PT Student                  Therapy Charges for Today       Code Description Service  Date Service Provider Modifiers Qty    96389298637 HC PT EVAL LOW COMPLEXITY 3 10/17/2024 Matheus Jones, PT Student GP 1            PT G-Codes  Outcome Measure Options: (P) AM-PAC 6 Clicks Basic Mobility (PT)  AM-PAC 6 Clicks Score (PT): (P) 16    Matheus Jones PT Student  10/17/2024

## 2024-10-17 NOTE — PLAN OF CARE
Goal Outcome Evaluation:  Plan of Care Reviewed With: patient        Progress: no change  PT A&O able to voice needs and wants currently resting in bed no current issues or concerns cont POC

## 2024-10-17 NOTE — CONSULTS
"Nutrition Services    Patient Name: Eric Huerta  YOB: 1940  MRN: [id number]  Admission date: 10/15/2024      CLINICAL NUTRITION ASSESSMENT      Reason for Assessment  MST Score 2+ and Identified at Risk by Screening Criteria    H&P:  Past Medical History:   Diagnosis Date    Arthritis     Cancer     SKIN CANCER ON FACE HAS NOT SEEN DERMATOLIGIST AS OF 10/15/24    COPD (chronic obstructive pulmonary disease)     GERD (gastroesophageal reflux disease)     Hyperlipidemia     Hypertension         Current Problems:   Active Hospital Problems    Diagnosis     **Acute exacerbation of chronic obstructive pulmonary disease (COPD)     COPD exacerbation         Nutrition/Diet History         Narrative   Per chart graphics patient with adequate po intake, % meals consumed. Receiving nutrition supplements TID. BM+. Patient's family met with palliative today. [facility] consulted. RD will continue with current nutrition intervention at this time.      Anthropometrics        Current Height, Weight Height: 167.4 cm (65.91\")  Weight: 71.7 kg (158 lb 1.1 oz)   Current BMI Body mass index is 25.59 kg/m².   BMI Classification Overweight   % %   Adjusted Body Weight (ABW) N/A   Weight Hx  Wt Readings from Last 30 Encounters:   10/15/24 2114 71.7 kg (158 lb 1.1 oz)          Wt Change Observation No measures to trend.     Estimated/Assessed Needs  Estimated Needs based on: Current Body Weight       Energy Requirements 30-35 kcal/kg    EST Needs (kcal/day) 0886-9793 kcal       Protein Requirements 1.0-1.2 g/kg   EST Daily Needs (g/day) 72-86 g       Fluid Requirements 25-30 ml/kg    Estimated Needs (mL/day) 0567-7412 ml     Labs/Medications         Pertinent Labs Reviewed.   Results from last 7 days   Lab Units 10/17/24  0458 10/10/24  1456   SODIUM mmol/L 136 138   POTASSIUM mmol/L 3.7 4.0   CHLORIDE mmol/L 97* 93*   TOTAL CO2 mmol/L  --  38*   CO2 mmol/L 31.6*  --    BUN mg/dL 21 16   CREATININE mg/dL 0.91 1.0 " "  CALCIUM mg/dL 11.7* 12.9*   BILIRUBIN mg/dL  --  0.85   ALK PHOS U/L  --  105*   ALT (SGPT) U/L  --  9   AST (SGOT) U/L  --  19   GLUCOSE mg/dL 132*  --      Results from last 7 days   Lab Units 10/17/24  0458 10/10/24  1456   HEMOGLOBIN g/dL 13.4  --    HEMATOCRIT % 41.5  --    TRIGLYCERIDES mg/dL  --  183*     No results found for: \"COVID19\"  Lab Results   Component Value Date    HGBA1C 5.9 (H) 10/10/2024         Pertinent Medications Reviewed.     Malnutrition Severity Assessment              Nutrition Diagnosis         Nutrition Dx Problem 1 Increased nutrient needs (protein) related to increased nutrient needs due to catabolic disease as evidenced by  COPD     Nutrition Intervention           Current Nutrition Orders & Evaluation of Intake       Current PO Diet Diet: Cardiac; Healthy Heart (2-3 Na+); Fluid Consistency: Thin (IDDSI 0)   Supplement No active supplement orders           Nutrition Intervention/Prescription        Boost GC TID (+190 kcal, 16 g pro each)        Medical Nutrition Therapy/Nutrition Education          Learner     Readiness N/A  N/A     Method     Response N/A  N/A     Monitor/Evaluation        Monitor Per protocol, PO intake, Supplement intake, Pertinent labs, Weight, POC/GOC     Nutrition Discharge Plan         Recommend to continue oral nutrition supplements on discharge.      Electronically signed by:  Sylvia Cramer RD  10/17/24 11:06 EDT    "

## 2024-10-17 NOTE — PROGRESS NOTES
"DAILY PROGRESS NOTE  HOSPITALIST GROUP      PATIENT IDENTIFICATION    Name: Eric Huerta  :  1940  MRN: [id number]    CHIEF COMPLAINT/PRINCIPAL DIAGNOSIS: Acute exacerbation of chronic obstructive pulmonary disease (COPD)       SUBJECTIVE    Doing ok. Breathing a bit better. No fevers. Less production in cough    ROS:   Gen: No fever or chills  CV: no chest pain or palpitation  Resp: + shortness of breath and cough  GI: no nausea, vomiting, diarrhea  Neuro: no headache or dizziness     OBJECTIVE     Exam:  /95 (BP Location: Right arm, Patient Position: Lying)   Pulse (!) 125   Temp 97.3 °F (36.3 °C) (Oral)   Resp 18   Ht 167.4 cm (65.91\")   Wt 71.7 kg (158 lb 1.1 oz)   SpO2 95%   BMI 25.59 kg/m²   Intake/Output last 24 hours:    Intake/Output Summary (Last 24 hours) at 10/17/2024 1045  Last data filed at 10/17/2024 0821  Gross per 24 hour   Intake 820 ml   Output 350 ml   Net 470 ml        Gen: NAD, up in bed  Resp: rhonchi and minimal wheezing, mild increased work of breathing  CV: RRR, no m/r/g. No peripheral edema  GI: Soft, nontender, (+) BS. nondistended  Psych: Alert and Oriented x 1-2, Mood and affect appropriate to situation  Skin: warm and dry on palpation. No rash on inspection.  Neuro: moves all 4 extremities, follows simple commands    DATA REVIEW:  Lab Results (last 24 hours)       Procedure Component Value Units Date/Time    CBC & Differential [[id number]]  (Abnormal) Collected: 10/17/24 0458    Specimen: Blood from Arm, Right Updated: 10/17/24 06    Narrative:      The following orders were created for panel order CBC & Differential.  Procedure                               Abnormality         Status                     ---------                               -----------         ------                     CBC Auto Differential[[id number]]        Abnormal            Final result               Scan Slide[[id number]]                                                                " "    Please view results for these tests on the individual orders.    CBC Auto Differential [[id number]]  (Abnormal) Collected: 10/17/24 0458    Specimen: Blood from Arm, Right Updated: 10/17/24 0609     WBC 15.73 10*3/mm3      RBC 4.35 10*6/mm3      Hemoglobin 13.4 g/dL      Hematocrit 41.5 %      MCV 95.4 fL      MCH 30.8 pg      MCHC 32.3 g/dL      RDW 12.7 %      RDW-SD 44.6 fl      MPV 10.6 fL      Platelets 186 10*3/mm3     Manual Differential [[id number]]  (Abnormal) Collected: 10/17/24 0458    Specimen: Blood from Arm, Right Updated: 10/17/24 0609     Neutrophil % 95.0 %      Lymphocyte % 1.0 %      Monocyte % 2.0 %      Bands %  1.0 %      Myelocyte % 1.0 %      Neutrophils Absolute 15.10 10*3/mm3      Lymphocytes Absolute 0.16 10*3/mm3      Monocytes Absolute 0.31 10*3/mm3      RBC Morphology Normal     WBC Morphology Normal     Platelet Estimate Adequate    Procalcitonin [[id number]]  (Normal) Collected: 10/17/24 0458    Specimen: Blood from Arm, Right Updated: 10/17/24 0600     Procalcitonin 0.10 ng/mL     Narrative:      As a Marker for Sepsis (Non-Neonates):    1. <0.5 ng/mL represents a low risk of severe sepsis and/or septic shock.  2. >2 ng/mL represents a high risk of severe sepsis and/or septic shock.    As a Marker for Lower Respiratory Tract Infections that require antibiotic therapy:    PCT on Admission    Antibiotic Therapy       6-12 Hrs later    >0.5                Strongly Recommended  >0.25 - <0.5        Recommended  0.1 - 0.25          Discouraged              Remeasure/reassess PCT  <0.1                Strongly Discouraged     Remeasure/reassess PCT    As 28 day mortality risk marker: \"Change in Procalcitonin Result\" (>80% or <=80%) if Day 0 (or Day 1) and Day 4 values are available. Refer to http://www.[employer]OVE[employer]-pct-calculator.com    Change in PCT <=80%  A decrease of PCT levels below or equal to 80% defines a positive change in PCT test result representing a higher risk for 28-day all-cause " mortality of patients diagnosed with severe sepsis for septic shock.    Change in PCT >80%  A decrease of PCT levels of more than 80% defines a negative change in PCT result representing a lower risk for 28-day all-cause mortality of patients diagnosed with severe sepsis or septic shock.    This test is Prognostic not Diagnostic, if elevated correlate with clinical findings before administering antibiotic treatment.        Basic Metabolic Panel [[id number]]  (Abnormal) Collected: 10/17/24 0458    Specimen: Blood from Arm, Right Updated: 10/17/24 0556     Glucose 132 mg/dL      BUN 21 mg/dL      Creatinine 0.91 mg/dL      Sodium 136 mmol/L      Potassium 3.7 mmol/L      Chloride 97 mmol/L      CO2 31.6 mmol/L      Calcium 11.7 mg/dL      BUN/Creatinine Ratio 23.1     Anion Gap 7.4 mmol/L      eGFR 82.6 mL/min/1.73     Narrative:      GFR Normal >60  Chronic Kidney Disease <60  Kidney Failure <15    The GFR formula is only valid for adults with stable renal function between ages 18 and 70.    POC Glucose Once [[id number]]  (Abnormal) Collected: 10/17/24 0223    Specimen: Blood Updated: 10/17/24 0225     Glucose 161 mg/dL      Comment: Serial Number: [id number]:  [id number]       POC Glucose Finger Q6H [[id number]]  (Abnormal) Collected: 10/16/24 1822    Specimen: Blood from Finger Updated: 10/16/24 1824     Glucose 159 mg/dL      Comment: Serial Number: [id number]:  [id number]       POC Glucose Once [[id number]]  (Abnormal) Collected: 10/16/24 1238    Specimen: Blood Updated: 10/16/24 1241     Glucose 129 mg/dL      Comment: Serial Number: [id number]:  [id number]               Imaging Results (Last 24 Hours)       ** No results found for the last 24 hours. **            Labs and imaging noted above have been personally reviewed by me.    Scheduled Meds:apixaban, 5 mg, Oral, BID  arformoterol, 15 mcg, Nebulization, BID - RT  atorvastatin, 10 mg, Oral, Daily  azithromycin, 500 mg, Oral, Q24H  budesonide,  0.5 mg, Nebulization, BID - RT  cefTRIAXone, 1,000 mg, Intravenous, Q24H  ipratropium-albuterol, 3 mL, Nebulization, Q6H While Awake - RT  methylPREDNISolone sodium succinate, 40 mg, Intravenous, Q8H  metoprolol tartrate, 25 mg, Oral, BID  pantoprazole, 40 mg, Oral, Q AM  PARoxetine, 10 mg, Oral, QAM  sodium chloride, 10 mL, Intravenous, Q12H  tamsulosin, 0.4 mg, Oral, Daily  terazosin, 1 mg, Oral, Nightly      Continuous Infusions:     PRN Meds:  senna-docusate sodium **AND** polyethylene glycol **AND** bisacodyl **AND** bisacodyl    dextrose    dextrose    glucagon (human recombinant)    sodium chloride    ASSESSMENT/PLAN      Acute exacerbation of chronic obstructive pulmonary disease (COPD)    COPD exacerbation    Acute on chronic hypoxic respiratory failure  COPD Exacerbation  Acute PE  RLL pneumonia  C/f primary lung malignancy  - CTA chest at OSH showed multifocal calcific pleural plaquing with tiny right pleural effusion possibly due to asbestosis, mid mediastinal right infrahilar adenopathy likely neoplastic as well as noncalcified nodule in the left lower lobe also likely neoplastic. Also with encasement of segmental arterial branches of the right lower lobe as well as tiny occlusive thrombus in the subsegmental vessels   - cont CTX/Azithro, day 2  - cont duonebs, budesonide/formoterol nebs  - reduce steroids to q12h  - switch tx lovenox to home eliquis  - procal normal  - SLP to eval  - discussed at length with son regarding findings on CT scan -- he is not even sure if this is cancer that they would want treatment. Regardless he is not a candidate for treatment at this point given his debilitated state. Palliative consulted and [facility] referral made  - he follows with Pulmonologist in [place]    AFib  - resume eliquis  - resume home metoprolol    Urinary retention  - peoples placed at OSH, cont for now  - started flomax    CAD  - eliquis, statin, bblocker    HTN  - home meds    Dementia  - delirium  precautions      Nutrition - Diet: Cardiac; Healthy Heart (2-3 Na+); Fluid Consistency: Thin (IDDSI 0)   DVT Prophylaxis - eliquis  Code Status - full   GI ppx - none  Disposition - tbd, await [facility] vick Hoffman MD  Hospitalist Group  10/17/2024  10:45 EDT

## 2024-10-18 LAB
GLUCOSE BLDC GLUCOMTR-MCNC: 136 MG/DL (ref 70–99)
GLUCOSE BLDC GLUCOMTR-MCNC: 147 MG/DL (ref 70–99)

## 2024-10-18 PROCEDURE — 87077 CULTURE AEROBIC IDENTIFY: CPT | Performed by: INTERNAL MEDICINE

## 2024-10-18 PROCEDURE — 25010000002 METHYLPREDNISOLONE PER 40 MG: Performed by: INTERNAL MEDICINE

## 2024-10-18 PROCEDURE — 94664 DEMO&/EVAL PT USE INHALER: CPT

## 2024-10-18 PROCEDURE — 99232 SBSQ HOSP IP/OBS MODERATE 35: CPT | Performed by: INTERNAL MEDICINE

## 2024-10-18 PROCEDURE — 94799 UNLISTED PULMONARY SVC/PX: CPT

## 2024-10-18 PROCEDURE — 87205 SMEAR GRAM STAIN: CPT | Performed by: INTERNAL MEDICINE

## 2024-10-18 PROCEDURE — 25010000002 CEFTRIAXONE PER 250 MG: Performed by: STUDENT IN AN ORGANIZED HEALTH CARE EDUCATION/TRAINING PROGRAM

## 2024-10-18 PROCEDURE — 87070 CULTURE OTHR SPECIMN AEROBIC: CPT | Performed by: INTERNAL MEDICINE

## 2024-10-18 PROCEDURE — 87186 SC STD MICRODIL/AGAR DIL: CPT | Performed by: INTERNAL MEDICINE

## 2024-10-18 PROCEDURE — 94761 N-INVAS EAR/PLS OXIMETRY MLT: CPT

## 2024-10-18 PROCEDURE — 82948 REAGENT STRIP/BLOOD GLUCOSE: CPT

## 2024-10-18 RX ADMIN — TAMSULOSIN HYDROCHLORIDE 0.4 MG: 0.4 CAPSULE ORAL at 10:54

## 2024-10-18 RX ADMIN — Medication 10 ML: at 20:53

## 2024-10-18 RX ADMIN — IPRATROPIUM BROMIDE AND ALBUTEROL SULFATE 3 ML: .5; 3 SOLUTION RESPIRATORY (INHALATION) at 12:47

## 2024-10-18 RX ADMIN — PAROXETINE HYDROCHLORIDE 10 MG: 20 TABLET, FILM COATED ORAL at 10:54

## 2024-10-18 RX ADMIN — BUDESONIDE 0.5 MG: 0.5 SUSPENSION RESPIRATORY (INHALATION) at 07:00

## 2024-10-18 RX ADMIN — SODIUM CHLORIDE 1000 MG: 9 INJECTION, SOLUTION INTRAMUSCULAR; INTRAVENOUS; SUBCUTANEOUS at 11:45

## 2024-10-18 RX ADMIN — METOPROLOL TARTRATE 25 MG: 25 TABLET, FILM COATED ORAL at 10:55

## 2024-10-18 RX ADMIN — ARFORMOTEROL TARTRATE 15 MCG: 15 SOLUTION RESPIRATORY (INHALATION) at 19:12

## 2024-10-18 RX ADMIN — METHYLPREDNISOLONE SODIUM SUCCINATE 40 MG: 40 INJECTION, POWDER, FOR SOLUTION INTRAMUSCULAR; INTRAVENOUS at 20:40

## 2024-10-18 RX ADMIN — APIXABAN 5 MG: 2.5 TABLET, FILM COATED ORAL at 10:56

## 2024-10-18 RX ADMIN — METOPROLOL TARTRATE 25 MG: 25 TABLET, FILM COATED ORAL at 20:53

## 2024-10-18 RX ADMIN — BUDESONIDE 0.5 MG: 0.5 SUSPENSION RESPIRATORY (INHALATION) at 19:12

## 2024-10-18 RX ADMIN — IPRATROPIUM BROMIDE AND ALBUTEROL SULFATE 3 ML: .5; 3 SOLUTION RESPIRATORY (INHALATION) at 07:00

## 2024-10-18 RX ADMIN — TERAZOSIN HYDROCHLORIDE 1 MG: 1 CAPSULE ORAL at 20:40

## 2024-10-18 RX ADMIN — IPRATROPIUM BROMIDE AND ALBUTEROL SULFATE 3 ML: .5; 3 SOLUTION RESPIRATORY (INHALATION) at 19:12

## 2024-10-18 RX ADMIN — METHYLPREDNISOLONE SODIUM SUCCINATE 40 MG: 40 INJECTION, POWDER, FOR SOLUTION INTRAMUSCULAR; INTRAVENOUS at 10:54

## 2024-10-18 RX ADMIN — Medication 10 ML: at 10:54

## 2024-10-18 RX ADMIN — ARFORMOTEROL TARTRATE 15 MCG: 15 SOLUTION RESPIRATORY (INHALATION) at 07:00

## 2024-10-18 RX ADMIN — APIXABAN 5 MG: 2.5 TABLET, FILM COATED ORAL at 20:40

## 2024-10-18 RX ADMIN — ATORVASTATIN CALCIUM 10 MG: 10 TABLET, FILM COATED ORAL at 10:54

## 2024-10-18 RX ADMIN — PANTOPRAZOLE SODIUM 40 MG: 40 TABLET, DELAYED RELEASE ORAL at 05:47

## 2024-10-18 NOTE — PROGRESS NOTES
[facility]   Hospitalist Progress Note  Date: 10/18/2024  Patient Name: Eric Huerta  : 1940  MRN: [id number]  Date of admission: 10/15/2024  Room/Bed: 3001/1      Subjective   Subjective     Chief Complaint:   Shortness of breath    Summary:  Eric Huerta is an 84 y.o. male with medical history of COPD on home oxygen, A-fib on Eliquis, hypertension, hyperlipidemia, CAD, BPH presented to the ER at the outside facility with complaints of shortness of breath has been progressively worsening in the last 2 weeks. Labs at the outside facility were significant for WBC elevated at 13, rest of the CBC with no significant findings, ABG 7.4 / on 3 L calcium 12.5, BUN 27, creatinine 0.9, sodium 136, ionized calcium 1.74, rest of the CMP with no significant findings, D-dimer elevated 1336.  Troponin 0.06.  Pro.  BNP elevated at 4394.  Given acute hypoxia and elevated D-dimer, CTA chest was done which showed mid mediastinal right infrahilar adenopathy likely neoplastic as well as noncalcified nodule in the left lower lobe also likely neoplastic.  Differentials includes primary lung neoplasm with hilar adenopathy and metastasis versus mesothelioma which is less likely.  Encasement of segmental arterial branches of the right lower lobe as well as tiny occlusive thrombus in the subsegmental vessels. Request was made to transfer the patient for further management of COPD exacerbation as well as evaluation by pulmonology for possible biopsy for lung cancer.  Patient was seen by pulmonology service.  Imaging very concerning for lung cancer, but unknown type.  However given patient's low functional status recommendations against biopsy as patient would not tolerate chemotherapy/radiation.  Patient and family met with palliative care and hospice services.     Interval Followup:   Patient seen and evaluated this morning on rounds.  No acute issues overnight.  Discussed with patient, he understands current situation and  agrees with hospice discharge.  Current plan is to complete course of antibiotics and treatment of pneumonia and improve patient from COPD standpoint before the possibility of discharging home with hospice.    Objective   Objective     Vitals:   Temp:  [97.2 °F (36.2 °C)-97.5 °F (36.4 °C)] 97.5 °F (36.4 °C)  Heart Rate:  [] 101  Resp:  [18-20] 18  BP: (113-141)/(75-91) 122/80  Flow (L/min) (Oxygen Therapy):  [3] 3    Physical Exam   Gen: NAD, up in bed, resting comfortably  Resp: rhonchi and minimal wheezing heard throughout, no increased work of breathing  CV: RRR, no m/r/g. No peripheral edema  GI: Soft, nontender, (+) BS. nondistended  Psych: Alert and Oriented x 1-2, Mood and affect appropriate to situation  Skin: warm and dry on palpation. No rash on inspection.  Neuro: moves all 4 extremities, follows simple commands    Result Review    Result Review:  I have personally reviewed these results:  [x]  Laboratory      Lab 10/17/24  0458   WBC 15.73*   HEMOGLOBIN 13.4   HEMATOCRIT 41.5   PLATELETS 186   NEUTROS ABS 15.10*   MCV 95.4   PROCALCITONIN 0.10         Lab 10/17/24  0458   SODIUM 136   POTASSIUM 3.7   CHLORIDE 97*   CO2 31.6*   ANION GAP 7.4   BUN 21   CREATININE 0.91   EGFR 82.6   GLUCOSE 132*   CALCIUM 11.7*                         Brief Urine Lab Results  (Last result in the past 365 days)        Color   Clarity   Blood   Leuk Est   Nitrite   Protein   CREAT   Urine HCG        10/10/24 1456           6.5                 [x]  Microbiology   Microbiology Results (last 10 days)       Procedure Component Value - Date/Time    COVID-19, ABBOTT IN-HOUSE,NASAL Swab (NO TRANSPORT MEDIA) 2 HR TAT - , [[id number]] Collected: 10/15/24 1303    Lab Status: Final result Updated: 10/15/24 2101          [x]  Radiology  CT Angiogram Chest Pulmonary Embolism    Result Date: 10/15/2024  Findings consistent with chronic interstitial changes most severe in the lower lobes with associated thickening of the bronchial  walls. There is multifocal calcific pleural plaquing with tiny right pleural effusion possibly due to asbestosis. Mid mediastinal and right infrahilar adenopathy likely neoplastic as well as noncalcified nodule in the left lower lobe also likely neoplastic. Differential diagnosis includes primary lung neoplasm with hilar adenopathy and metastasis versus mesothelioma which is less likely. Encasement of segmental arterial branches of the right lower lobe as well as tiny occlusive thrombus in the subsegmental vessels Electronically Signed: Kamari Apple MD 2024/10/15 at 15:22 CDT Reading Location ID and State: 1006 / [place] Tel [phone] , Service support  [phone], Fax [phone]    XR Abdomen KUB    Result Date: 10/15/2024  Non-obstructive bowel gas pattern. Electronically Signed: Elena Wechsler, MD 2024/10/15 at 12:04 CDT Reading Location ID and State: 1196 / [place] Tel [phone], Service support  [phone], Fax [phone]    XR Chest 1 View    Result Date: 10/15/2024  No radiographic evidence of acute cardiopulmonary disease. Electronically Signed: Elena Wechsler, MD 2024/10/15 at 11:55 CDT Reading Location ID and State: 1196 / [place] Tel [phone], Service support  [phone], Fax [phone]   []  EKG/Telemetry   []  Cardiology/Vascular   []  Pathology  []  Old records  []  Other:    Assessment & Plan   Assessment / Plan     Assessment:  Lung mass  COPD in acute exacerbation  Right lower lobe pneumonia  Pulmonary embolus  Atrial fibrillation  Urinary retention  Coronary artery disease  Hypertension  Dementia    Plan:  Patient remains admitted to hospital for further care and management  Pulmonologist consulted, appreciate assistance  Given patient's functional status, biopsy will not be pursued for patient's concerning mass  Decision was made to have patient discharged home with hospice, which to treat pneumonia and COPD exacerbation first  Patient will receive short course of  antibiotics, started on IV Solu-Medrol  Continues on scheduled and as needed breathing treatments  Will try and get patient improved clinically before discharging back home under the care of hospice  Patient is not currently receiving comfort meds only  However if patient's clinical course changes, will discuss with patient and family about transition to comfort measures only while inpatient         Discussed with RN.  Discussed with the palliative team    VTE Prophylaxis:  Pharmacologic & mechanical VTE prophylaxis orders are present.        CODE STATUS:   Medical Intervention Limits: No intubation (DNI)  Level Of Support Discussed With: Patient; Health Care Surrogate  Code Status (Patient has no pulse and is not breathing): No CPR (Do Not Attempt to Resuscitate)  Medical Interventions (Patient has pulse or is breathing): Limited Support      Electronically signed by Ritesh Sinclair MD, 10/18/2024, 14:39 EDT.

## 2024-10-18 NOTE — PLAN OF CARE
Goal Outcome Evaluation:              Outcome Evaluation: Pt is alert and oriented but sleeping throughout day. 3L NC at 96% with crackles and wheezes. Sputum collection sent to lab. Q6 blood sugar checks WNL. Catheter care complete. Family at bedside. Continue plan of care.

## 2024-10-18 NOTE — PLAN OF CARE
Goal Outcome Evaluation:  Plan of Care Reviewed With: patient        Progress: improving  Outcome Evaluation: Pt contiues with A-fib. Pt easily tired and SOB with minimal exertion.

## 2024-10-18 NOTE — CONSULTS
Visit made to patient bedside to discuss [facility] services. Patient's goals of care and current hospital course/disease progression discussed. Patient's goals of care align with hospice care at this time and he is medically eligible under the Medicare benefit. Family state that they can care for patient in the home through end of life. Pulmonology suggested finishing course of antibiotics with last day being tomorrow, 10/19/24. [facility] to visit patient in home upon discharge from hospital. Patient will need EMS transport home. Oxygen and all needed DME already in home.

## 2024-10-19 LAB
ANION GAP SERPL CALCULATED.3IONS-SCNC: 4.9 MMOL/L (ref 5–15)
BUN SERPL-MCNC: 26 MG/DL (ref 8–23)
BUN/CREAT SERPL: 33.8 (ref 7–25)
CALCIUM SPEC-SCNC: 11.2 MG/DL (ref 8.6–10.5)
CHLORIDE SERPL-SCNC: 97 MMOL/L (ref 98–107)
CO2 SERPL-SCNC: 33.1 MMOL/L (ref 22–29)
CREAT SERPL-MCNC: 0.77 MG/DL (ref 0.76–1.27)
DEPRECATED RDW RBC AUTO: 44.2 FL (ref 37–54)
EGFRCR SERPLBLD CKD-EPI 2021: 87.7 ML/MIN/1.73
ERYTHROCYTE [DISTWIDTH] IN BLOOD BY AUTOMATED COUNT: 12.5 % (ref 12.3–15.4)
GLUCOSE BLDC GLUCOMTR-MCNC: 111 MG/DL (ref 70–99)
GLUCOSE BLDC GLUCOMTR-MCNC: 128 MG/DL (ref 70–99)
GLUCOSE BLDC GLUCOMTR-MCNC: 226 MG/DL (ref 70–99)
GLUCOSE BLDC GLUCOMTR-MCNC: 95 MG/DL (ref 70–99)
GLUCOSE SERPL-MCNC: 97 MG/DL (ref 65–99)
HCT VFR BLD AUTO: 41.7 % (ref 37.5–51)
HGB BLD-MCNC: 13.4 G/DL (ref 13–17.7)
MAGNESIUM SERPL-MCNC: 2.1 MG/DL (ref 1.6–2.4)
MAGNESIUM SERPL-MCNC: 2.6 MG/DL (ref 1.6–2.4)
MCH RBC QN AUTO: 30.7 PG (ref 26.6–33)
MCHC RBC AUTO-ENTMCNC: 32.1 G/DL (ref 31.5–35.7)
MCV RBC AUTO: 95.4 FL (ref 79–97)
PLATELET # BLD AUTO: 157 10*3/MM3 (ref 140–450)
PMV BLD AUTO: 10.5 FL (ref 6–12)
POTASSIUM SERPL-SCNC: 4 MMOL/L (ref 3.5–5.2)
QT INTERVAL: 345 MS
QTC INTERVAL: 463 MS
RBC # BLD AUTO: 4.37 10*6/MM3 (ref 4.14–5.8)
SODIUM SERPL-SCNC: 135 MMOL/L (ref 136–145)
TROPONIN T SERPL HS-MCNC: 48 NG/L
TSH SERPL DL<=0.05 MIU/L-ACNC: 1.64 UIU/ML (ref 0.27–4.2)
WBC NRBC COR # BLD AUTO: 13.09 10*3/MM3 (ref 3.4–10.8)

## 2024-10-19 PROCEDURE — 25010000002 CEFTRIAXONE PER 250 MG: Performed by: STUDENT IN AN ORGANIZED HEALTH CARE EDUCATION/TRAINING PROGRAM

## 2024-10-19 PROCEDURE — 85027 COMPLETE CBC AUTOMATED: CPT | Performed by: INTERNAL MEDICINE

## 2024-10-19 PROCEDURE — 80048 BASIC METABOLIC PNL TOTAL CA: CPT | Performed by: INTERNAL MEDICINE

## 2024-10-19 PROCEDURE — 94799 UNLISTED PULMONARY SVC/PX: CPT

## 2024-10-19 PROCEDURE — 83735 ASSAY OF MAGNESIUM: CPT | Performed by: FAMILY MEDICINE

## 2024-10-19 PROCEDURE — 94761 N-INVAS EAR/PLS OXIMETRY MLT: CPT

## 2024-10-19 PROCEDURE — 82948 REAGENT STRIP/BLOOD GLUCOSE: CPT

## 2024-10-19 PROCEDURE — 94664 DEMO&/EVAL PT USE INHALER: CPT

## 2024-10-19 PROCEDURE — 83735 ASSAY OF MAGNESIUM: CPT | Performed by: INTERNAL MEDICINE

## 2024-10-19 PROCEDURE — 93005 ELECTROCARDIOGRAM TRACING: CPT | Performed by: FAMILY MEDICINE

## 2024-10-19 PROCEDURE — 25010000002 METHYLPREDNISOLONE PER 40 MG: Performed by: INTERNAL MEDICINE

## 2024-10-19 PROCEDURE — 84484 ASSAY OF TROPONIN QUANT: CPT | Performed by: FAMILY MEDICINE

## 2024-10-19 PROCEDURE — 99232 SBSQ HOSP IP/OBS MODERATE 35: CPT | Performed by: INTERNAL MEDICINE

## 2024-10-19 PROCEDURE — 84443 ASSAY THYROID STIM HORMONE: CPT | Performed by: FAMILY MEDICINE

## 2024-10-19 PROCEDURE — 82948 REAGENT STRIP/BLOOD GLUCOSE: CPT | Performed by: STUDENT IN AN ORGANIZED HEALTH CARE EDUCATION/TRAINING PROGRAM

## 2024-10-19 RX ORDER — LORAZEPAM 2 MG/ML
0.5 CONCENTRATE ORAL
Status: DISCONTINUED | OUTPATIENT
Start: 2024-10-19 | End: 2024-10-20 | Stop reason: HOSPADM

## 2024-10-19 RX ORDER — PREDNISONE 20 MG/1
40 TABLET ORAL
Status: DISCONTINUED | OUTPATIENT
Start: 2024-10-20 | End: 2024-10-20 | Stop reason: HOSPADM

## 2024-10-19 RX ORDER — ATROPINE SULFATE 10 MG/ML
SOLUTION/ DROPS OPHTHALMIC
Qty: 15 ML | Refills: 12 | Status: SHIPPED | OUTPATIENT
Start: 2024-10-19

## 2024-10-19 RX ORDER — MORPHINE SULFATE 20 MG/ML
10 SOLUTION ORAL
Status: DISCONTINUED | OUTPATIENT
Start: 2024-10-19 | End: 2024-10-20 | Stop reason: HOSPADM

## 2024-10-19 RX ORDER — ATROPINE SULFATE 10 MG/ML
2 SOLUTION/ DROPS OPHTHALMIC
Status: DISCONTINUED | OUTPATIENT
Start: 2024-10-19 | End: 2024-10-20 | Stop reason: HOSPADM

## 2024-10-19 RX ORDER — MORPHINE SULFATE 20 MG/ML
10 SOLUTION ORAL
Qty: 180 ML | Refills: 0 | Status: SHIPPED | OUTPATIENT
Start: 2024-10-19

## 2024-10-19 RX ORDER — LORAZEPAM 2 MG/ML
0.5 CONCENTRATE ORAL
Qty: 30 ML | Refills: 0 | Status: SHIPPED | OUTPATIENT
Start: 2024-10-19 | End: 2024-10-26

## 2024-10-19 RX ADMIN — IPRATROPIUM BROMIDE AND ALBUTEROL SULFATE 3 ML: .5; 3 SOLUTION RESPIRATORY (INHALATION) at 12:46

## 2024-10-19 RX ADMIN — PAROXETINE HYDROCHLORIDE 10 MG: 20 TABLET, FILM COATED ORAL at 08:11

## 2024-10-19 RX ADMIN — TERAZOSIN HYDROCHLORIDE 1 MG: 1 CAPSULE ORAL at 20:43

## 2024-10-19 RX ADMIN — APIXABAN 5 MG: 2.5 TABLET, FILM COATED ORAL at 08:11

## 2024-10-19 RX ADMIN — Medication 10 ML: at 08:12

## 2024-10-19 RX ADMIN — BUDESONIDE 0.5 MG: 0.5 SUSPENSION RESPIRATORY (INHALATION) at 20:04

## 2024-10-19 RX ADMIN — IPRATROPIUM BROMIDE AND ALBUTEROL SULFATE 3 ML: .5; 3 SOLUTION RESPIRATORY (INHALATION) at 06:21

## 2024-10-19 RX ADMIN — APIXABAN 5 MG: 2.5 TABLET, FILM COATED ORAL at 20:44

## 2024-10-19 RX ADMIN — ATORVASTATIN CALCIUM 10 MG: 10 TABLET, FILM COATED ORAL at 08:11

## 2024-10-19 RX ADMIN — IPRATROPIUM BROMIDE AND ALBUTEROL SULFATE 3 ML: .5; 3 SOLUTION RESPIRATORY (INHALATION) at 20:04

## 2024-10-19 RX ADMIN — METHYLPREDNISOLONE SODIUM SUCCINATE 40 MG: 40 INJECTION, POWDER, FOR SOLUTION INTRAMUSCULAR; INTRAVENOUS at 08:11

## 2024-10-19 RX ADMIN — Medication 10 ML: at 21:00

## 2024-10-19 RX ADMIN — METOPROLOL TARTRATE 25 MG: 25 TABLET, FILM COATED ORAL at 08:11

## 2024-10-19 RX ADMIN — BUDESONIDE 0.5 MG: 0.5 SUSPENSION RESPIRATORY (INHALATION) at 06:21

## 2024-10-19 RX ADMIN — TAMSULOSIN HYDROCHLORIDE 0.4 MG: 0.4 CAPSULE ORAL at 08:11

## 2024-10-19 RX ADMIN — ARFORMOTEROL TARTRATE 15 MCG: 15 SOLUTION RESPIRATORY (INHALATION) at 06:21

## 2024-10-19 RX ADMIN — SODIUM CHLORIDE 1000 MG: 9 INJECTION, SOLUTION INTRAMUSCULAR; INTRAVENOUS; SUBCUTANEOUS at 12:06

## 2024-10-19 RX ADMIN — ARFORMOTEROL TARTRATE 15 MCG: 15 SOLUTION RESPIRATORY (INHALATION) at 20:04

## 2024-10-19 RX ADMIN — MORPHINE SULFATE 10 MG: 10 SOLUTION ORAL at 20:43

## 2024-10-19 RX ADMIN — METOPROLOL TARTRATE 25 MG: 25 TABLET, FILM COATED ORAL at 20:44

## 2024-10-19 RX ADMIN — PANTOPRAZOLE SODIUM 40 MG: 40 TABLET, DELAYED RELEASE ORAL at 06:36

## 2024-10-19 NOTE — PLAN OF CARE
Goal Outcome Evaluation:  Plan of Care Reviewed With: patient        Progress: improving  Outcome Evaluation: Pt sleeping throughout shift. Stated they were 'very tired' and requested 'quiet' to rest. Clustered care to accomodate pt's desires. SOB and cough noted when pt exerted themselves, including when attempting a conversation.

## 2024-10-19 NOTE — PROGRESS NOTES
[facility]   Hospitalist Progress Note  Date: 10/19/2024  Patient Name: Eric Huerta  : 1940  MRN: [id number]  Date of admission: 10/15/2024  Room/Bed: 3001/1      Subjective   Subjective     Chief Complaint:   Shortness of breath    Summary:  Eric Huerta is an 84 y.o. male with medical history of COPD on home oxygen, A-fib on Eliquis, hypertension, hyperlipidemia, CAD, BPH presented to the ER at the outside facility with complaints of shortness of breath has been progressively worsening in the last 2 weeks. Labs at the outside facility were significant for WBC elevated at 13, rest of the CBC with no significant findings, ABG 7.4 / on 3 L calcium 12.5, BUN 27, creatinine 0.9, sodium 136, ionized calcium 1.74, rest of the CMP with no significant findings, D-dimer elevated 1336.  Troponin 0.06.  Pro.  BNP elevated at 4394.  Given acute hypoxia and elevated D-dimer, CTA chest was done which showed mid mediastinal right infrahilar adenopathy likely neoplastic as well as noncalcified nodule in the left lower lobe also likely neoplastic.  Differentials includes primary lung neoplasm with hilar adenopathy and metastasis versus mesothelioma which is less likely.  Encasement of segmental arterial branches of the right lower lobe as well as tiny occlusive thrombus in the subsegmental vessels. Request was made to transfer the patient for further management of COPD exacerbation as well as evaluation by pulmonology for possible biopsy for lung cancer.  Patient was seen by pulmonology service.  Imaging very concerning for lung cancer, but unknown type.  However given patient's low functional status recommendations against biopsy as patient would not tolerate chemotherapy/radiation.  Patient and family met with palliative care and hospice services.     Interval Followup:   Continues with coarse cough.  Able to meet with family at bedside.  Confusion about timing of discharge.  However will attempt discharge  tomorrow under the care of hospice so that hospital bed can be delivered.  Patient completing antibiotics today.    Objective   Objective     Vitals:   Temp:  [97.3 °F (36.3 °C)-98 °F (36.7 °C)] 98 °F (36.7 °C)  Heart Rate:  [] 92  Resp:  [16-20] 16  BP: (105-131)/(76-97) 116/76  Flow (L/min) (Oxygen Therapy):  [3] 3    Physical Exam   Gen: NAD, up in bed, resting comfortably  Resp: rhonchi and minimal wheezing heard throughout, no increased work of breathing  CV: RRR, no m/r/g. No peripheral edema  GI: Soft, nontender, (+) BS. nondistended  Psych: Alert and Oriented x 1-2, Mood and affect appropriate to situation  Skin: warm and dry on palpation. No rash on inspection.  Neuro: moves all 4 extremities, follows simple commands    Result Review    Result Review:  I have personally reviewed these results:  [x]  Laboratory      Lab 10/19/24  0532 10/17/24  0458   WBC 13.09* 15.73*   HEMOGLOBIN 13.4 13.4   HEMATOCRIT 41.7 41.5   PLATELETS 157 186   NEUTROS ABS  --  15.10*   MCV 95.4 95.4   PROCALCITONIN  --  0.10         Lab 10/19/24  0532 10/17/24  0458   SODIUM 135* 136   POTASSIUM 4.0 3.7   CHLORIDE 97* 97*   CO2 33.1* 31.6*   ANION GAP 4.9* 7.4   BUN 26* 21   CREATININE 0.77 0.91   EGFR 87.7 82.6   GLUCOSE 97 132*   CALCIUM 11.2* 11.7*   MAGNESIUM 2.1  --                          Brief Urine Lab Results  (Last result in the past 365 days)        Color   Clarity   Blood   Leuk Est   Nitrite   Protein   CREAT   Urine HCG        10/10/24 1456           6.5                 [x]  Microbiology   Microbiology Results (last 10 days)       Procedure Component Value - Date/Time    Respiratory Culture - Sputum, Cough [[id number]]  (Abnormal) Collected: 10/18/24 1412    Lab Status: Preliminary result Specimen: Sputum from Cough Updated: 10/19/24 1320     Respiratory Culture Moderate growth (3+) Gram Negative Bacilli      Rare growth Normal Respiratory Leda     Gram Stain Moderate (3+) WBCs seen      Moderate (3+) Gram  negative bacilli      Rare (1+) Yeast    COVID-19, ABBOTT IN-HOUSE,NASAL Swab (NO TRANSPORT MEDIA) 2 HR TAT - , [[id number]] Collected: 10/15/24 1303    Lab Status: Final result Updated: 10/15/24 2101          [x]  Radiology  CT Angiogram Chest Pulmonary Embolism    Result Date: 10/15/2024  Findings consistent with chronic interstitial changes most severe in the lower lobes with associated thickening of the bronchial walls. There is multifocal calcific pleural plaquing with tiny right pleural effusion possibly due to asbestosis. Mid mediastinal and right infrahilar adenopathy likely neoplastic as well as noncalcified nodule in the left lower lobe also likely neoplastic. Differential diagnosis includes primary lung neoplasm with hilar adenopathy and metastasis versus mesothelioma which is less likely. Encasement of segmental arterial branches of the right lower lobe as well as tiny occlusive thrombus in the subsegmental vessels Electronically Signed: aKmari Apple MD 2024/10/15 at 15:22 CDT Reading Location ID and State: 1006 / [place] Tel [phone], Service support  [phone], Fax [phone]    XR Abdomen KUB    Result Date: 10/15/2024  Non-obstructive bowel gas pattern. Electronically Signed: Elena Wechsler, MD 2024/10/15 at 12:04 CDT Reading Location ID and State: 1196 / [place] Tel [phone], Service support  [phone], Fax [phone]    XR Chest 1 View    Result Date: 10/15/2024  No radiographic evidence of acute cardiopulmonary disease. Electronically Signed: Elena Wechsler, MD 2024/10/15 at 11:55 CDT Reading Location ID and State: 1196 / [place] Tel [phone], Service support  [phone], Fax [phone]   []  EKG/Telemetry   []  Cardiology/Vascular   []  Pathology  []  Old records  []  Other:    Assessment & Plan   Assessment / Plan     Assessment:  Lung mass  COPD in acute exacerbation  Right lower lobe pneumonia  Pulmonary embolus  Atrial fibrillation  Urinary retention  Coronary artery  disease  Hypertension  Dementia    Plan:  Patient remains admitted to hospital for further care and management  Pulmonologist consulted, appreciate assistance  Given patient's functional status, biopsy will not be pursued for patient's concerning mass  Decision was made to have patient discharged home with hospice, which to treat pneumonia and COPD exacerbation first  Completing antibiotics, transitioning prednisone to oral  Continues on scheduled and as needed breathing treatments  Will try and get patient improved clinically before discharging back home under the care of hospice  Plan for possible discharge under hospice care tomorrow  Patient is not currently receiving comfort meds only  However if patient's clinical course changes, will discuss with patient and family about transition to comfort measures only while inpatient     Discussed with RN.  Discussed with family at bedside    VTE Prophylaxis:  Pharmacologic & mechanical VTE prophylaxis orders are present.        CODE STATUS:   Medical Intervention Limits: No intubation (DNI)  Level Of Support Discussed With: Patient; Health Care Surrogate  Code Status (Patient has no pulse and is not breathing): No CPR (Do Not Attempt to Resuscitate)  Medical Interventions (Patient has pulse or is breathing): Limited Support      Electronically signed by Ritesh Sinclair MD, 10/19/2024, 14:59 EDT.

## 2024-10-19 NOTE — PLAN OF CARE
Goal Outcome Evaluation:      Vitals stabled during shift. Pt alert and oriented with intermittent confusion at times. Family at bedside throughout the day. Suction set up for intermittent use of thick secretions. Educated pt on use of suction and family is helping pt as needed. Plan is to discharge home tomorrow after Hospice delivers hospital bed to home. Family is aware. No complaints at this time. Call light within reach. Continue care plan.

## 2024-10-20 VITALS
DIASTOLIC BLOOD PRESSURE: 87 MMHG | HEART RATE: 71 BPM | HEIGHT: 66 IN | SYSTOLIC BLOOD PRESSURE: 121 MMHG | OXYGEN SATURATION: 93 % | BODY MASS INDEX: 25.4 KG/M2 | RESPIRATION RATE: 20 BRPM | TEMPERATURE: 97.1 F | WEIGHT: 158.07 LBS

## 2024-10-20 LAB
BACTERIA SPEC RESP CULT: ABNORMAL
BACTERIA SPEC RESP CULT: ABNORMAL
GEN 5 2HR TROPONIN T REFLEX: 52 NG/L
GLUCOSE BLDC GLUCOMTR-MCNC: 129 MG/DL (ref 70–99)
GRAM STN SPEC: ABNORMAL
TROPONIN T DELTA: 4 NG/L

## 2024-10-20 PROCEDURE — 94799 UNLISTED PULMONARY SVC/PX: CPT

## 2024-10-20 PROCEDURE — 63710000001 PREDNISONE PER 1 MG: Performed by: INTERNAL MEDICINE

## 2024-10-20 PROCEDURE — 94664 DEMO&/EVAL PT USE INHALER: CPT

## 2024-10-20 PROCEDURE — 82948 REAGENT STRIP/BLOOD GLUCOSE: CPT | Performed by: STUDENT IN AN ORGANIZED HEALTH CARE EDUCATION/TRAINING PROGRAM

## 2024-10-20 PROCEDURE — 99239 HOSP IP/OBS DSCHRG MGMT >30: CPT | Performed by: INTERNAL MEDICINE

## 2024-10-20 PROCEDURE — 84484 ASSAY OF TROPONIN QUANT: CPT | Performed by: FAMILY MEDICINE

## 2024-10-20 RX ORDER — IPRATROPIUM BROMIDE AND ALBUTEROL SULFATE 2.5; .5 MG/3ML; MG/3ML
3 SOLUTION RESPIRATORY (INHALATION) EVERY 4 HOURS PRN
Status: DISCONTINUED | OUTPATIENT
Start: 2024-10-20 | End: 2024-10-20 | Stop reason: HOSPADM

## 2024-10-20 RX ADMIN — BUDESONIDE 0.5 MG: 0.5 SUSPENSION RESPIRATORY (INHALATION) at 06:54

## 2024-10-20 RX ADMIN — PAROXETINE HYDROCHLORIDE 10 MG: 20 TABLET, FILM COATED ORAL at 09:22

## 2024-10-20 RX ADMIN — ATORVASTATIN CALCIUM 10 MG: 10 TABLET, FILM COATED ORAL at 09:22

## 2024-10-20 RX ADMIN — PREDNISONE 40 MG: 20 TABLET ORAL at 09:22

## 2024-10-20 RX ADMIN — METOPROLOL TARTRATE 25 MG: 25 TABLET, FILM COATED ORAL at 09:23

## 2024-10-20 RX ADMIN — ARFORMOTEROL TARTRATE 15 MCG: 15 SOLUTION RESPIRATORY (INHALATION) at 06:54

## 2024-10-20 RX ADMIN — IPRATROPIUM BROMIDE AND ALBUTEROL SULFATE 3 ML: .5; 3 SOLUTION RESPIRATORY (INHALATION) at 06:54

## 2024-10-20 RX ADMIN — Medication 10 ML: at 09:23

## 2024-10-20 RX ADMIN — TAMSULOSIN HYDROCHLORIDE 0.4 MG: 0.4 CAPSULE ORAL at 09:22

## 2024-10-20 RX ADMIN — PANTOPRAZOLE SODIUM 40 MG: 40 TABLET, DELAYED RELEASE ORAL at 06:05

## 2024-10-20 RX ADMIN — APIXABAN 5 MG: 2.5 TABLET, FILM COATED ORAL at 09:22

## 2024-10-20 NOTE — PLAN OF CARE
Goal Outcome Evaluation:      Vitals stable during shift. Pt alert and oriented with mild intermittent confusion during morning rounds. Throughout the day pt started to get more lethargic. Family at bedside. Hospice set up oxygen and bed today for pt to discharge home. Will keep peoples catheter in for comfort measures. No complaints at this time. Pt will discharge home with hospice by EMS. Care plan complete.

## 2024-10-20 NOTE — DISCHARGE SUMMARY
[facility]         HOSPITALIST  DISCHARGE SUMMARY    Patient Name: Eric Huerta  : 1940  MRN: [id number]    Date of Admission: 10/15/2024  Date of Discharge:  10/20/2024  Primary Care Physician: Isela Mack APRN    Consults:  Pulmonology    Active and Resolved Hospital Problems:  Lung mass  COPD in acute exacerbation  Right lower lobe pneumonia  Pulmonary embolus  Atrial fibrillation  Urinary retention  Coronary artery disease  Hypertension  Dementia    Hospital Course     Hospital Course:  Eric Huerta is a 84 y.o. male with medical history of COPD on home oxygen, A-fib on Eliquis, hypertension, hyperlipidemia, CAD, BPH presented to the ER at the outside facility with complaints of shortness of breath has been progressively worsening in the last 2 weeks. Labs at the outside facility were significant for WBC elevated at 13, rest of the CBC with no significant findings, ABG 7.4 6//87 on 3 L calcium 12.5, BUN 27, creatinine 0.9, sodium 136, ionized calcium 1.74, rest of the CMP with no significant findings, D-dimer elevated 1336.  Troponin 0.06.  Pro.  BNP elevated at 4394.  Given acute hypoxia and elevated D-dimer, CTA chest was done which showed mid mediastinal right infrahilar adenopathy likely neoplastic as well as noncalcified nodule in the left lower lobe also likely neoplastic.  Differentials includes primary lung neoplasm with hilar adenopathy and metastasis versus mesothelioma which is less likely.  Encasement of segmental arterial branches of the right lower lobe as well as tiny occlusive thrombus in the subsegmental vessels. Request was made to transfer the patient for further management of COPD exacerbation as well as evaluation by pulmonology for possible biopsy for lung cancer.  Patient was seen by pulmonology service.  Imaging very concerning for lung cancer, but unknown type.  However given patient's low functional status recommendations against biopsy as patient would not  tolerate chemotherapy/radiation.  Patient and family met with palliative care and hospice services.  Decision was to pursue comfort measures.  Patient will be discharging under the care of hospice services.  Patient discharged stable on 10/20/2024 home to the care of hospice.     DISCHARGE Follow Up Recommendations for labs and diagnostics:   Follow-up with hospice services on discharge      Day of Discharge     Vital Signs:  Temp:  [97.3 °F (36.3 °C)-98.1 °F (36.7 °C)] 97.3 °F (36.3 °C)  Heart Rate:  [] 74  Resp:  [16-20] 20  BP: (118-141)/() 122/70  Flow (L/min) (Oxygen Therapy):  [3-4] 3  Physical Exam:   Gen: NAD, up in bed, resting comfortably  Resp: rhonchi and minimal wheezing heard throughout, no increased work of breathing  CV: RRR, no m/r/g. No peripheral edema  GI: Soft, nontender, (+) BS. nondistended  Psych: Alert and Oriented x 1-2, Mood and affect appropriate to situation  Skin: warm and dry on palpation. No rash on inspection.  Neuro: moves all 4 extremities, follows simple commands       Discharge Details        Discharge Medications        New Medications        Instructions Start Date   atropine 1 % ophthalmic solution   2 drop, Sublingual, Every 1 Hour PRN, secretions or terminal congestion, Starting on Sat 10/19/24 at 1516 Instructions:Give sublingually to control oral secretions      LORazepam 2 MG/ML concentrated solution  Commonly known as: ATIVAN   0.5 mg, Oral, Every 1 Hour PRN      morphine 20 MG/ML concentrated solution 20mg/ml   10 mg, Oral, Every 1 Hour PRN      naloxone 4 MG/0.1ML nasal spray  Commonly known as: NARCAN   Call 911. Don't prime. [place] in 1 nostril for overdose. Repeat in 2-3 minutes in other nostril if no or minimal breathing/responsiveness.             Continue These Medications        Instructions Start Date   apixaban 2.5 MG tablet tablet  Commonly known as: ELIQUIS   2.5 mg, 2 Times Daily      Breo Ellipta 200-25 MCG/ACT inhaler  Generic drug: Fluticasone  Furoate-Vilanterol   1 puff, Daily - RT      doxazosin 1 MG tablet  Commonly known as: CARDURA   1 mg, Nightly      esomeprazole 40 MG capsule  Commonly known as: nexIUM   40 mg, Every Morning Before Breakfast      furosemide 20 MG tablet  Commonly known as: LASIX   20 mg, Daily PRN      ipratropium-albuterol 0.5-2.5 mg/3 ml nebulizer  Commonly known as: DUO-NEB   3 mL, Every 4 Hours PRN      lovastatin 20 MG 24 hr tablet  Commonly known as: ALTOPREV   20 mg, Nightly      metoprolol tartrate 25 MG tablet  Commonly known as: LOPRESSOR   25 mg, 2 Times Daily      pantoprazole 20 MG EC tablet  Commonly known as: PROTONIX   20 mg, Daily      PARoxetine 10 MG tablet  Commonly known as: PAXIL   10 mg, Every Morning      potassium chloride 20 MEQ CR tablet  Commonly known as: KLOR-CON M20   20 mEq, Daily               No Known Allergies    Discharge Disposition:  Hospice/Home    Diet:  Hospital:  Diet Order   Procedures    Diet: Regular/House; Fluid Consistency: Thin (IDDSI 0)       Discharge Activity:       CODE STATUS:  Code Status and Medical Interventions: No CPR (Do Not Attempt to Resuscitate); Limited Support; No intubation (DNI)   Ordered at: 10/17/24 1244     Medical Intervention Limits:    No intubation (DNI)     Level Of Support Discussed With:    Patient    Health Care Surrogate     Code Status (Patient has no pulse and is not breathing):    No CPR (Do Not Attempt to Resuscitate)     Medical Interventions (Patient has pulse or is breathing):    Limited Support         No future appointments.    Additional Instructions for the Follow-ups that You Need to Schedule       Discharge Follow-up with PCP   As directed       Currently Documented PCP:    Isela Mcak APRN    PCP Phone Number:    [phone]     Follow Up Details: In less than one week        Discharge Follow-up with Specified Provider: Hospice; Today   As directed      To: Hospice   Follow Up: Today                Pertinent  and/or Most Recent Results      PROCEDURES:   None     LAB RESULTS:      Lab 10/19/24  0532 10/17/24  0458   WBC 13.09* 15.73*   HEMOGLOBIN 13.4 13.4   HEMATOCRIT 41.7 41.5   PLATELETS 157 186   NEUTROS ABS  --  15.10*   MCV 95.4 95.4   PROCALCITONIN  --  0.10         Lab 10/19/24  2306 10/19/24  0532 10/17/24  0458   SODIUM  --  135* 136   POTASSIUM  --  4.0 3.7   CHLORIDE  --  97* 97*   CO2  --  33.1* 31.6*   ANION GAP  --  4.9* 7.4   BUN  --  26* 21   CREATININE  --  0.77 0.91   EGFR  --  87.7 82.6   GLUCOSE  --  97 132*   CALCIUM  --  11.2* 11.7*   MAGNESIUM 2.6* 2.1  --    TSH 1.640  --   --              Lab 10/20/24  0216 10/19/24  2306   HSTROP T 52* 48*                 Brief Urine Lab Results  (Last result in the past 365 days)        Color   Clarity   Blood   Leuk Est   Nitrite   Protein   CREAT   Urine HCG        10/10/24 1456           6.5                 Microbiology Results (last 10 days)       Procedure Component Value - Date/Time    Respiratory Culture - Sputum, Cough [[id number]]  (Abnormal)  (Susceptibility) Collected: 10/18/24 1412    Lab Status: Final result Specimen: Sputum from Cough Updated: 10/20/24 1233     Respiratory Culture Moderate growth (3+) Pseudomonas aeruginosa      Rare growth Normal Respiratory Leda     Gram Stain Moderate (3+) WBCs seen      Moderate (3+) Gram negative bacilli      Rare (1+) Yeast    Susceptibility        Pseudomonas aeruginosa      KRISTIAN      Cefepime Susceptible      Ceftazidime Susceptible      Ciprofloxacin Susceptible      Levofloxacin Susceptible      Piperacillin + Tazobactam Susceptible      Tobramycin Susceptible                       Susceptibility Comments       Pseudomonas aeruginosa    With the exception of urinary-sourced infections, aminoglycosides should not be used as monotherapy.               COVID-19, ABBOTT IN-HOUSE,NASAL Swab (NO TRANSPORT MEDIA) 2 HR TAT - , [[id number]] Collected: 10/15/24 1303    Lab Status: Final result Updated: 10/15/24 2101            CT Angiogram  Chest Pulmonary Embolism    Result Date: 10/15/2024  Impression: Findings consistent with chronic interstitial changes most severe in the lower lobes with associated thickening of the bronchial walls. There is multifocal calcific pleural plaquing with tiny right pleural effusion possibly due to asbestosis. Mid mediastinal and right infrahilar adenopathy likely neoplastic as well as noncalcified nodule in the left lower lobe also likely neoplastic. Differential diagnosis includes primary lung neoplasm with hilar adenopathy and metastasis versus mesothelioma which is less likely. Encasement of segmental arterial branches of the right lower lobe as well as tiny occlusive thrombus in the subsegmental vessels Electronically Signed: Kamari Apple MD 2024/10/15 at 15:22 CDT Reading Location ID and State: [address] / [place] Tel [phone], Service support  [phone], Fax [phone]    XR Abdomen KUB    Result Date: 10/15/2024  Impression: Non-obstructive bowel gas pattern. Electronically Signed: Elena Wechsler, MD 2024/10/15 at 12:04 CDT Reading Location ID and State: [place] / [place] Tel [phone], Service support  [phone], Fax [phone]    XR Chest 1 View    Result Date: 10/15/2024  Impression: No radiographic evidence of acute cardiopulmonary disease. Electronically Signed: Elena Wechsler, MD 2024/10/15 at 11:55 CDT Reading Location ID and State: [place] / [place] Tel [phone], Service support  [phone], Fax [phone]     Results for orders placed during the hospital encounter of 10/15/24    Duplex Venous Lower Extremity - Bilateral CV-READ    Interpretation Summary    Left popliteal fossa fluid collection.    Normal bilateral lower extremity venous duplex scan.      Results for orders placed during the hospital encounter of 10/15/24    Duplex Venous Lower Extremity - Bilateral CV-READ    Interpretation Summary    Left popliteal fossa fluid collection.    Normal bilateral lower extremity venous duplex  scan.          Labs Pending at Discharge:        Time spent on Discharge including face to face service:  38 minutes    Electronically signed by Ritesh Sinclair MD, 10/20/24, 2:01 PM EDT.

## 2024-10-20 NOTE — PLAN OF CARE
"Goal Outcome Evaluation:  Plan of Care Reviewed With: patient        Progress: declining  Outcome Evaluation: Pt noted to have increasing confusion and fatigue this shift. Pt having difficulty forming coherent sentences and often lost his train of thought. Also showed difficulty remembering sequencing of basic tasks. Pt required more assistance from staff with basic tasks than previously.  Pt noted to have several runs of V-tach between 1063-3926. Provider notified. New orders received. Pt refused ABG and BG checks. Pt states he is \"ready to go home\".                             "

## 2024-10-20 NOTE — SIGNIFICANT NOTE
10/17/24 1333   OTHER   Discipline speech language pathologist   Rehab Time/Intention   Session Not Performed other (see comments)  (Patient may be transitioning to comfort care, waiting for confirmation.)   Recommendations   SLP - Next Appointment 10/18/24       
   10/20/24 0909   Physical Therapy Time and Intention   Session Not Performed   (Pt has orders for discharge today.)       
No

## 2024-10-30 LAB
QT INTERVAL: 345 MS
QTC INTERVAL: 463 MS